# Patient Record
Sex: FEMALE | Race: WHITE | NOT HISPANIC OR LATINO | Employment: FULL TIME | URBAN - NONMETROPOLITAN AREA
[De-identification: names, ages, dates, MRNs, and addresses within clinical notes are randomized per-mention and may not be internally consistent; named-entity substitution may affect disease eponyms.]

---

## 2020-08-13 ENCOUNTER — APPOINTMENT (EMERGENCY)
Dept: CT IMAGING | Facility: HOSPITAL | Age: 60
DRG: 552 | End: 2020-08-13
Payer: COMMERCIAL

## 2020-08-13 ENCOUNTER — HOSPITAL ENCOUNTER (INPATIENT)
Facility: HOSPITAL | Age: 60
LOS: 1 days | Discharge: HOME | DRG: 552 | End: 2020-08-14
Attending: EMERGENCY MEDICINE | Admitting: INTERNAL MEDICINE
Payer: COMMERCIAL

## 2020-08-13 DIAGNOSIS — M54.50 ACUTE LOW BACK PAIN: Primary | ICD-10-CM

## 2020-08-13 DIAGNOSIS — K35.30 ACUTE APPENDICITIS WITH LOCALIZED PERITONITIS, WITHOUT PERFORATION OR GANGRENE, UNSPECIFIED WHETHER ABSCESS PRESENT: ICD-10-CM

## 2020-08-13 DIAGNOSIS — K37 APPENDICITIS: ICD-10-CM

## 2020-08-13 DIAGNOSIS — M54.9 BACK PAIN: ICD-10-CM

## 2020-08-13 LAB
ALBUMIN SERPL BCP-MCNC: 4.2 G/DL (ref 3.5–5)
ALP SERPL-CCNC: 72 U/L (ref 46–116)
ALT SERPL W P-5'-P-CCNC: 33 U/L (ref 12–78)
ANION GAP SERPL CALCULATED.3IONS-SCNC: 8 MMOL/L (ref 4–13)
AST SERPL W P-5'-P-CCNC: 22 U/L (ref 5–45)
BASOPHILS # BLD AUTO: 0.06 THOUSANDS/ΜL (ref 0–0.1)
BASOPHILS NFR BLD AUTO: 1 % (ref 0–1)
BILIRUB SERPL-MCNC: 0.34 MG/DL (ref 0.2–1)
BUN SERPL-MCNC: 21 MG/DL (ref 5–25)
CALCIUM SERPL-MCNC: 9.2 MG/DL (ref 8.3–10.1)
CHLORIDE SERPL-SCNC: 105 MMOL/L (ref 100–108)
CO2 SERPL-SCNC: 29 MMOL/L (ref 21–32)
CREAT SERPL-MCNC: 1.07 MG/DL (ref 0.6–1.3)
EOSINOPHIL # BLD AUTO: 0.19 THOUSAND/ΜL (ref 0–0.61)
EOSINOPHIL NFR BLD AUTO: 2 % (ref 0–6)
ERYTHROCYTE [DISTWIDTH] IN BLOOD BY AUTOMATED COUNT: 12.7 % (ref 11.6–15.1)
GFR SERPL CREATININE-BSD FRML MDRD: 57 ML/MIN/1.73SQ M
GLUCOSE SERPL-MCNC: 118 MG/DL (ref 65–140)
HCT VFR BLD AUTO: 41.7 % (ref 34.8–46.1)
HGB BLD-MCNC: 13.7 G/DL (ref 11.5–15.4)
IMM GRANULOCYTES # BLD AUTO: 0.04 THOUSAND/UL (ref 0–0.2)
IMM GRANULOCYTES NFR BLD AUTO: 0 % (ref 0–2)
LYMPHOCYTES # BLD AUTO: 2.49 THOUSANDS/ΜL (ref 0.6–4.47)
LYMPHOCYTES NFR BLD AUTO: 21 % (ref 14–44)
MCH RBC QN AUTO: 31.5 PG (ref 26.8–34.3)
MCHC RBC AUTO-ENTMCNC: 32.9 G/DL (ref 31.4–37.4)
MCV RBC AUTO: 96 FL (ref 82–98)
MONOCYTES # BLD AUTO: 0.8 THOUSAND/ΜL (ref 0.17–1.22)
MONOCYTES NFR BLD AUTO: 7 % (ref 4–12)
NEUTROPHILS # BLD AUTO: 8.45 THOUSANDS/ΜL (ref 1.85–7.62)
NEUTS SEG NFR BLD AUTO: 69 % (ref 43–75)
NRBC BLD AUTO-RTO: 0 /100 WBCS
PLATELET # BLD AUTO: 219 THOUSANDS/UL (ref 149–390)
PMV BLD AUTO: 11.2 FL (ref 8.9–12.7)
POTASSIUM SERPL-SCNC: 4.6 MMOL/L (ref 3.5–5.3)
PROT SERPL-MCNC: 7 G/DL (ref 6.4–8.2)
RBC # BLD AUTO: 4.35 MILLION/UL (ref 3.81–5.12)
SODIUM SERPL-SCNC: 142 MMOL/L (ref 136–145)
WBC # BLD AUTO: 12.03 THOUSAND/UL (ref 4.31–10.16)

## 2020-08-13 PROCEDURE — 96374 THER/PROPH/DIAG INJ IV PUSH: CPT

## 2020-08-13 PROCEDURE — 99285 EMERGENCY DEPT VISIT HI MDM: CPT

## 2020-08-13 PROCEDURE — 85025 COMPLETE CBC W/AUTO DIFF WBC: CPT | Performed by: EMERGENCY MEDICINE

## 2020-08-13 PROCEDURE — 36415 COLL VENOUS BLD VENIPUNCTURE: CPT | Performed by: EMERGENCY MEDICINE

## 2020-08-13 PROCEDURE — 96361 HYDRATE IV INFUSION ADD-ON: CPT

## 2020-08-13 PROCEDURE — 96375 TX/PRO/DX INJ NEW DRUG ADDON: CPT

## 2020-08-13 PROCEDURE — G1004 CDSM NDSC: HCPCS

## 2020-08-13 PROCEDURE — 80053 COMPREHEN METABOLIC PANEL: CPT | Performed by: EMERGENCY MEDICINE

## 2020-08-13 PROCEDURE — 99285 EMERGENCY DEPT VISIT HI MDM: CPT | Performed by: EMERGENCY MEDICINE

## 2020-08-13 PROCEDURE — 74177 CT ABD & PELVIS W/CONTRAST: CPT

## 2020-08-13 PROCEDURE — 96376 TX/PRO/DX INJ SAME DRUG ADON: CPT

## 2020-08-13 RX ORDER — NAPROXEN 500 MG/1
500 TABLET ORAL 2 TIMES DAILY WITH MEALS
Qty: 30 TABLET | Refills: 0 | Status: SHIPPED | OUTPATIENT
Start: 2020-08-13 | End: 2020-08-17 | Stop reason: ALTCHOICE

## 2020-08-13 RX ORDER — ROSUVASTATIN CALCIUM 10 MG/1
10 TABLET, COATED ORAL
COMMUNITY
Start: 2020-06-09

## 2020-08-13 RX ORDER — KETOROLAC TROMETHAMINE 30 MG/ML
30 INJECTION, SOLUTION INTRAMUSCULAR; INTRAVENOUS ONCE
Status: COMPLETED | OUTPATIENT
Start: 2020-08-13 | End: 2020-08-13

## 2020-08-13 RX ORDER — CYCLOBENZAPRINE HCL 10 MG
10 TABLET ORAL 2 TIMES DAILY PRN
Qty: 20 TABLET | Refills: 0 | Status: SHIPPED | OUTPATIENT
Start: 2020-08-13 | End: 2020-08-17 | Stop reason: ALTCHOICE

## 2020-08-13 RX ORDER — OLANZAPINE 10 MG/1
10 TABLET, ORALLY DISINTEGRATING ORAL ONCE
Status: COMPLETED | OUTPATIENT
Start: 2020-08-13 | End: 2020-08-13

## 2020-08-13 RX ORDER — DIAZEPAM 5 MG/ML
5 INJECTION, SOLUTION INTRAMUSCULAR; INTRAVENOUS ONCE
Status: COMPLETED | OUTPATIENT
Start: 2020-08-13 | End: 2020-08-13

## 2020-08-13 RX ORDER — FENTANYL CITRATE 50 UG/ML
50 INJECTION, SOLUTION INTRAMUSCULAR; INTRAVENOUS ONCE
Status: COMPLETED | OUTPATIENT
Start: 2020-08-13 | End: 2020-08-13

## 2020-08-13 RX ORDER — FENTANYL CITRATE 50 UG/ML
25 INJECTION, SOLUTION INTRAMUSCULAR; INTRAVENOUS ONCE
Status: COMPLETED | OUTPATIENT
Start: 2020-08-13 | End: 2020-08-13

## 2020-08-13 RX ADMIN — IOHEXOL 100 ML: 350 INJECTION, SOLUTION INTRAVENOUS at 22:24

## 2020-08-13 RX ADMIN — SODIUM CHLORIDE 1000 ML: 0.9 INJECTION, SOLUTION INTRAVENOUS at 21:49

## 2020-08-13 RX ADMIN — KETOROLAC TROMETHAMINE 30 MG: 30 INJECTION, SOLUTION INTRAMUSCULAR at 19:22

## 2020-08-13 RX ADMIN — FENTANYL CITRATE 25 MCG: 50 INJECTION INTRAMUSCULAR; INTRAVENOUS at 20:36

## 2020-08-13 RX ADMIN — DIAZEPAM 5 MG: 10 INJECTION, SOLUTION INTRAMUSCULAR; INTRAVENOUS at 19:23

## 2020-08-13 RX ADMIN — OLANZAPINE 10 MG: 10 TABLET, ORALLY DISINTEGRATING ORAL at 21:49

## 2020-08-13 RX ADMIN — FENTANYL CITRATE 50 MCG: 50 INJECTION INTRAMUSCULAR; INTRAVENOUS at 21:46

## 2020-08-13 NOTE — ED PROVIDER NOTES
History  Chief Complaint   Patient presents with    Back Pain     Patient evaluated yesterday, had XRs done  Patient reports sciatic pain from left hip down to knee  32-year-old female presents to ED with lower back pain x1 hour  Patient was recently seen evaluated with similar complaints yesterday  Back Pain   Location:  Lumbar spine  Quality:  Shooting and stabbing  Radiates to:  L thigh and L knee  Pain severity:  Moderate  Onset quality:  Sudden  Timing:  Intermittent  Progression:  Worsening  Chronicity:  Recurrent  Relieved by:  None tried  Worsened by:  Nothing  Ineffective treatments:  None tried  Associated symptoms: leg pain and tingling    Associated symptoms: no abdominal swelling, no bladder incontinence, no bowel incontinence, no perianal numbness and no weakness        Prior to Admission Medications   Prescriptions Last Dose Informant Patient Reported? Taking? rosuvastatin (CRESTOR) 10 MG tablet 8/13/2020 at Unknown time  Yes Yes   Sig: Take 10 mg by mouth daily      Facility-Administered Medications: None       Past Medical History:   Diagnosis Date    Anxiety     Hyperlipemia     Psychiatric disorder     Sciatic leg pain        Past Surgical History:   Procedure Laterality Date    CHOLECYSTECTOMY         History reviewed  No pertinent family history  I have reviewed and agree with the history as documented  E-Cigarette/Vaping    E-Cigarette Use Never User      E-Cigarette/Vaping Substances    Nicotine No     THC No     CBD No     Flavoring No     Other No     Unknown No      Social History     Tobacco Use    Smoking status: Current Some Day Smoker    Smokeless tobacco: Never Used   Substance Use Topics    Alcohol use: Never     Alcohol/week: 0 0 standard drinks     Frequency: Never     Drinks per session: Patient refused     Binge frequency: Never    Drug use: Never       Review of Systems   Gastrointestinal: Negative for bowel incontinence     Genitourinary: Negative for bladder incontinence  Musculoskeletal: Positive for back pain  Neurological: Positive for tingling  Negative for weakness  All other systems reviewed and are negative  Physical Exam  Physical Exam  Vitals signs and nursing note reviewed  Constitutional:       General: She is in acute distress  Appearance: Normal appearance  HENT:      Head: Normocephalic and atraumatic  Right Ear: External ear normal       Left Ear: External ear normal       Nose: Nose normal       Mouth/Throat:      Mouth: Mucous membranes are dry  Eyes:      Extraocular Movements: Extraocular movements intact  Pupils: Pupils are equal, round, and reactive to light  Neck:      Musculoskeletal: Normal range of motion and neck supple  Cardiovascular:      Rate and Rhythm: Normal rate and regular rhythm  Pulses: Normal pulses  Heart sounds: Normal heart sounds  Pulmonary:      Effort: Pulmonary effort is normal       Breath sounds: Normal breath sounds  Abdominal:      General: Abdomen is flat  Musculoskeletal: Normal range of motion  Skin:     General: Skin is warm and dry  Capillary Refill: Capillary refill takes less than 2 seconds  Neurological:      General: No focal deficit present  Mental Status: She is alert and oriented to person, place, and time     Psychiatric:         Mood and Affect: Mood normal          Vital Signs  ED Triage Vitals [08/13/20 1915]   Temperature Pulse Respirations Blood Pressure SpO2   97 6 °F (36 4 °C) 102 20 132/97 98 %      Temp Source Heart Rate Source Patient Position - Orthostatic VS BP Location FiO2 (%)   Temporal Monitor Lying Left arm --      Pain Score       Worst Possible Pain           Vitals:    08/13/20 1915 08/14/20 0111   BP: 132/97 126/70   Pulse: 102 61   Patient Position - Orthostatic VS: Lying          Visual Acuity      ED Medications  Medications   dextrose 5 % and sodium chloride 0 45 % infusion (125 mL/hr Intravenous New Bag 8/14/20 0217)   piperacillin-tazobactam (ZOSYN) 3 375 g in sodium chloride 0 9 % 100 mL IVPB (3 375 g Intravenous New Bag 8/14/20 0624)   ondansetron (ZOFRAN) injection 4 mg (has no administration in time range)   pantoprazole (PROTONIX) injection 40 mg (has no administration in time range)   acetaminophen (TYLENOL) tablet 650 mg (650 mg Oral Given 8/14/20 0451)   morphine (PF) 4 mg/mL injection 4 mg (has no administration in time range)   ketorolac (TORADOL) injection 30 mg (30 mg Intravenous Given 8/13/20 1922)   diazepam (VALIUM) injection 5 mg (5 mg Intravenous Given 8/13/20 1923)   fentanyl citrate (PF) 100 MCG/2ML 25 mcg (25 mcg Intravenous Given 8/13/20 2036)   sodium chloride 0 9 % bolus 1,000 mL (0 mL Intravenous Stopped 8/13/20 2344)   fentanyl citrate (PF) 100 MCG/2ML 50 mcg (50 mcg Intravenous Given 8/13/20 2146)   OLANZapine (ZyPREXA ZYDIS) dispersible tablet 10 mg (10 mg Oral Given 8/13/20 2149)   iohexol (OMNIPAQUE) 350 MG/ML injection (SINGLE-DOSE) 100 mL (100 mL Intravenous Given 8/13/20 2224)   piperacillin-tazobactam (ZOSYN) 3 375 g in sodium chloride 0 9 % 100 mL IVPB (3 375 g Intravenous New Bag 8/14/20 0058)       Diagnostic Studies  Results Reviewed     Procedure Component Value Units Date/Time    Lactic acid, plasma [[id number]]  (Normal) Collected:  08/14/20 0037    Lab Status:  Final result Specimen:  Blood from Arm, Right Updated:  08/14/20 0106     LACTIC ACID 0 8 mmol/L     Narrative:       Result may be elevated if tourniquet was used during collection  Blood culture [[id number]] Collected:  08/14/20 0037    Lab Status: In process Specimen:  Blood from Arm, Right Updated:  08/14/20 0046    Blood culture [[id number]] Collected:  08/14/20 0037    Lab Status:   In process Specimen:  Blood from Arm, Left Updated:  08/14/20 0046    Comprehensive metabolic panel [[id number]] Collected:  08/13/20 2145    Lab Status:  Final result Specimen:  Blood from Arm, Right Updated:  08/13/20 220 Sodium 142 mmol/L      Potassium 4 6 mmol/L      Chloride 105 mmol/L      CO2 29 mmol/L      ANION GAP 8 mmol/L      BUN 21 mg/dL      Creatinine 1 07 mg/dL      Glucose 118 mg/dL      Calcium 9 2 mg/dL      AST 22 U/L      ALT 33 U/L      Alkaline Phosphatase 72 U/L      Total Protein 7 0 g/dL      Albumin 4 2 g/dL      Total Bilirubin 0 34 mg/dL      eGFR 57 ml/min/1 73sq m     Narrative:       [place] guidelines for Chronic Kidney Disease (CKD):     Stage 1 with normal or high GFR (GFR > 90 mL/min/1 73 square meters)    Stage 2 Mild CKD (GFR = 60-89 mL/min/1 73 square meters)    Stage 3A Moderate CKD (GFR = 45-59 mL/min/1 73 square meters)    Stage 3B Moderate CKD (GFR = 30-44 mL/min/1 73 square meters)    Stage 4 Severe CKD (GFR = 15-29 mL/min/1 73 square meters)    Stage 5 End Stage CKD (GFR <15 mL/min/1 73 square meters)  Note: GFR calculation is accurate only with a steady state creatinine    CBC and differential [[id number]]  (Abnormal) Collected:  08/13/20 2145    Lab Status:  Final result Specimen:  Blood from Arm, Right Updated:  08/13/20 2154     WBC 12 03 Thousand/uL      RBC 4 35 Million/uL      Hemoglobin 13 7 g/dL      Hematocrit 41 7 %      MCV 96 fL      MCH 31 5 pg      MCHC 32 9 g/dL      RDW 12 7 %      MPV 11 2 fL      Platelets 023 Thousands/uL      nRBC 0 /100 WBCs      Neutrophils Relative 69 %      Immat GRANS % 0 %      Lymphocytes Relative 21 %      Monocytes Relative 7 %      Eosinophils Relative 2 %      Basophils Relative 1 %      Neutrophils Absolute 8 45 Thousands/µL      Immature Grans Absolute 0 04 Thousand/uL      Lymphocytes Absolute 2 49 Thousands/µL      Monocytes Absolute 0 80 Thousand/µL      Eosinophils Absolute 0 19 Thousand/µL      Basophils Absolute 0 06 Thousands/µL                  CT abdomen pelvis with contrast   Final Result by Mary Pierson MD (08/14 0014)         1    Distended distal appendix without surrounding inflammation, concerning for early, acute appendicitis, in the appropriate clinical context  No evidence of bowel obstruction, colitis or diverticulitis  2   Mild narrowing of the origin of the celiac axis, suggestive of median arcuate ligament syndrome, in the appropriate clinical context  I personally discussed this study with Ana Cotto on 8/13/2020 at 10:53 PM                      Workstation performed: [id number]         CT recon only lumbar spine   Final Result by David Parry MD (08/13 2306)      Chronic disc and facet degenerative change in the lower lumbar spine  Workstation performed: [id number]                    Procedures  Procedures         ED Course  ED Course as of Aug 14 0633   Thu Aug 13, 2020   2130 Patient continues have pain after analgesics provided  Will grab labs and sent patient down for CT scan  2140 Patient's family member requesting emergent MRI  Patient currently does not have saddle anesthesia, no history of trauma, cancer or bowel/urinary incontinence  Family member was informed that we will do imaging, however currently no indications for emergent MRI  2143 Patient's orthopedic visit from yesterday was reviewed in [facility]       2300 Case discussed with radiologist, concern for appendicitis  Will admit patient  Fri Aug 14, 2020   0028 Case discussed with surgery, requested medicine admit for appendicitis  [id number] Surgery (Dr Regina Austin) requesting Zosyn  9834 Case discussed with hospitalist who will admit the patient  US AUDIT      Most Recent Value   Initial Alcohol Screen: US AUDIT-C    1  How often do you have a drink containing alcohol?  0 Filed at: 08/13/2020 1916   2  How many drinks containing alcohol do you have on a typical day you are drinking? 0 Filed at: 08/13/2020 1916   3a  Male UNDER 65: How often do you have five or more drinks on one occasion? 0 Filed at: 08/13/2020 1916   3b  FEMALE Any Age, or MALE 65+:  How often do you have 4 or more drinks on one occassion? 0 Filed at: 08/13/2020 1916   Audit-C Score  0 Filed at: 08/13/2020 1916                  MONIQUE/DAST-10      Most Recent Value   How many times in the past year have you    Used an illegal drug or used a prescription medication for non-medical reasons? Never Filed at: 08/13/2020 1916                                MDM      Disposition  Final diagnoses:   Acute low back pain   Back pain   Appendicitis     Time reflects when diagnosis was documented in both MDM as applicable and the Disposition within this note     Time User Action Codes Description Comment    8/13/2020  7:17 PM Violet Devoid [M54 5] Acute low back pain     8/14/2020 12:18 AM Jorge Umu Add [M54 9] Back pain     8/14/2020 12:18 AM Jorge Umu Add [K37] Appendicitis     8/14/2020  1:20 AM Ilir Rosmery Add [K35 30] Acute appendicitis with localized peritonitis, without perforation or gangrene, unspecified whether abscess present       ED Disposition     ED Disposition Condition Date/Time Comment    Admit Stable Fri Aug 14, 2020 12:31 AM Case was discussed with Deana Bailey the patient's admission status was agreed to be Admission Status: inpatient status to the service of Dr Juan José Christian          Follow-up Information     Follow up With Specialties Details Why Contact Info    Rafiq Felder MD Family Medicine Call in 1 day  [address] [place] [phone]            Current Discharge Medication List      START taking these medications    Details   cyclobenzaprine (FLEXERIL) 10 mg tablet Take 1 tablet (10 mg total) by mouth 2 (two) times a day as needed for muscle spasms  Qty: 20 tablet, Refills: 0    Associated Diagnoses: Acute low back pain      naproxen (NAPROSYN) 500 mg tablet Take 1 tablet (500 mg total) by mouth 2 (two) times a day with meals  Qty: 30 tablet, Refills: 0    Associated Diagnoses: Acute low back pain         CONTINUE these medications which have NOT CHANGED Details   rosuvastatin (CRESTOR) 10 MG tablet Take 10 mg by mouth daily           No discharge procedures on file      PDMP Review     None          ED Provider  Electronically Signed by           Sam Lowery DO  08/14/20 3258

## 2020-08-13 NOTE — ED NOTES
Patient's family in waiting room requesting to come back  Informed of one visitor policy  Patient agreeable to giving family update  Updated family, family requesting MRI and follow up with pain management        Sandra Mccullough RN  08/13/20 9478

## 2020-08-14 ENCOUNTER — TRANSCRIBE ORDERS (OUTPATIENT)
Dept: NEUROSURGERY | Facility: CLINIC | Age: 60
End: 2020-08-14

## 2020-08-14 VITALS
TEMPERATURE: 98 F | WEIGHT: 162.8 LBS | BODY MASS INDEX: 28.84 KG/M2 | SYSTOLIC BLOOD PRESSURE: 130 MMHG | OXYGEN SATURATION: 96 % | DIASTOLIC BLOOD PRESSURE: 67 MMHG | RESPIRATION RATE: 17 BRPM | HEIGHT: 63 IN | HEART RATE: 64 BPM

## 2020-08-14 DIAGNOSIS — M54.50 LOWER BACK PAIN: Primary | ICD-10-CM

## 2020-08-14 PROBLEM — K37 APPENDICITIS: Status: ACTIVE | Noted: 2020-08-14

## 2020-08-14 PROBLEM — M54.32 SCIATICA OF LEFT SIDE: Status: ACTIVE | Noted: 2020-08-14

## 2020-08-14 PROBLEM — K37 APPENDICITIS: Status: RESOLVED | Noted: 2020-08-14 | Resolved: 2020-08-14

## 2020-08-14 PROBLEM — K35.30 ACUTE APPENDICITIS WITH LOCALIZED PERITONITIS, WITHOUT PERFORATION OR GANGRENE: Status: ACTIVE | Noted: 2020-08-14

## 2020-08-14 LAB
ANION GAP SERPL CALCULATED.3IONS-SCNC: 8 MMOL/L (ref 4–13)
BASOPHILS # BLD AUTO: 0.06 THOUSANDS/ΜL (ref 0–0.1)
BASOPHILS NFR BLD AUTO: 1 % (ref 0–1)
BILIRUB UR QL STRIP: NEGATIVE
BUN SERPL-MCNC: 15 MG/DL (ref 5–25)
CALCIUM SERPL-MCNC: 8.2 MG/DL (ref 8.3–10.1)
CHLORIDE SERPL-SCNC: 107 MMOL/L (ref 100–108)
CLARITY UR: CLEAR
CO2 SERPL-SCNC: 25 MMOL/L (ref 21–32)
COLOR UR: YELLOW
CREAT SERPL-MCNC: 1 MG/DL (ref 0.6–1.3)
EOSINOPHIL # BLD AUTO: 0.22 THOUSAND/ΜL (ref 0–0.61)
EOSINOPHIL NFR BLD AUTO: 3 % (ref 0–6)
ERYTHROCYTE [DISTWIDTH] IN BLOOD BY AUTOMATED COUNT: 12.8 % (ref 11.6–15.1)
GFR SERPL CREATININE-BSD FRML MDRD: 62 ML/MIN/1.73SQ M
GLUCOSE SERPL-MCNC: 119 MG/DL (ref 65–140)
GLUCOSE UR STRIP-MCNC: NEGATIVE MG/DL
HCT VFR BLD AUTO: 38.6 % (ref 34.8–46.1)
HGB BLD-MCNC: 12.7 G/DL (ref 11.5–15.4)
HGB UR QL STRIP.AUTO: NEGATIVE
IMM GRANULOCYTES # BLD AUTO: 0.02 THOUSAND/UL (ref 0–0.2)
IMM GRANULOCYTES NFR BLD AUTO: 0 % (ref 0–2)
KETONES UR STRIP-MCNC: NEGATIVE MG/DL
LACTATE SERPL-SCNC: 0.8 MMOL/L (ref 0.5–2)
LEUKOCYTE ESTERASE UR QL STRIP: NEGATIVE
LYMPHOCYTES # BLD AUTO: 2.73 THOUSANDS/ΜL (ref 0.6–4.47)
LYMPHOCYTES NFR BLD AUTO: 34 % (ref 14–44)
MAGNESIUM SERPL-MCNC: 1.8 MG/DL (ref 1.6–2.6)
MCH RBC QN AUTO: 31.4 PG (ref 26.8–34.3)
MCHC RBC AUTO-ENTMCNC: 32.9 G/DL (ref 31.4–37.4)
MCV RBC AUTO: 96 FL (ref 82–98)
MONOCYTES # BLD AUTO: 0.67 THOUSAND/ΜL (ref 0.17–1.22)
MONOCYTES NFR BLD AUTO: 8 % (ref 4–12)
NEUTROPHILS # BLD AUTO: 4.41 THOUSANDS/ΜL (ref 1.85–7.62)
NEUTS SEG NFR BLD AUTO: 54 % (ref 43–75)
NITRITE UR QL STRIP: NEGATIVE
NRBC BLD AUTO-RTO: 0 /100 WBCS
PH UR STRIP.AUTO: 5.5 [PH]
PLATELET # BLD AUTO: 194 THOUSANDS/UL (ref 149–390)
PMV BLD AUTO: 10.7 FL (ref 8.9–12.7)
POTASSIUM SERPL-SCNC: 3.9 MMOL/L (ref 3.5–5.3)
PROT UR STRIP-MCNC: NEGATIVE MG/DL
RBC # BLD AUTO: 4.04 MILLION/UL (ref 3.81–5.12)
SODIUM SERPL-SCNC: 140 MMOL/L (ref 136–145)
SP GR UR STRIP.AUTO: 1.01 (ref 1–1.03)
UROBILINOGEN UR QL STRIP.AUTO: 0.2 E.U./DL
WBC # BLD AUTO: 8.11 THOUSAND/UL (ref 4.31–10.16)

## 2020-08-14 PROCEDURE — 99222 1ST HOSP IP/OBS MODERATE 55: CPT | Performed by: NURSE PRACTITIONER

## 2020-08-14 PROCEDURE — 80048 BASIC METABOLIC PNL TOTAL CA: CPT | Performed by: NURSE PRACTITIONER

## 2020-08-14 PROCEDURE — 81003 URINALYSIS AUTO W/O SCOPE: CPT | Performed by: NURSE PRACTITIONER

## 2020-08-14 PROCEDURE — NC001 PR NO CHARGE: Performed by: INTERNAL MEDICINE

## 2020-08-14 PROCEDURE — 99254 IP/OBS CNSLTJ NEW/EST MOD 60: CPT | Performed by: PHYSICIAN ASSISTANT

## 2020-08-14 PROCEDURE — 36415 COLL VENOUS BLD VENIPUNCTURE: CPT | Performed by: EMERGENCY MEDICINE

## 2020-08-14 PROCEDURE — 87040 BLOOD CULTURE FOR BACTERIA: CPT | Performed by: EMERGENCY MEDICINE

## 2020-08-14 PROCEDURE — C9113 INJ PANTOPRAZOLE SODIUM, VIA: HCPCS | Performed by: NURSE PRACTITIONER

## 2020-08-14 PROCEDURE — 83605 ASSAY OF LACTIC ACID: CPT | Performed by: EMERGENCY MEDICINE

## 2020-08-14 PROCEDURE — 85025 COMPLETE CBC W/AUTO DIFF WBC: CPT | Performed by: NURSE PRACTITIONER

## 2020-08-14 PROCEDURE — 83735 ASSAY OF MAGNESIUM: CPT | Performed by: NURSE PRACTITIONER

## 2020-08-14 RX ORDER — KETOROLAC TROMETHAMINE 10 MG/1
10 TABLET, FILM COATED ORAL EVERY 6 HOURS PRN
Qty: 10 TABLET | Refills: 0 | Status: SHIPPED | OUTPATIENT
Start: 2020-08-14 | End: 2022-05-26

## 2020-08-14 RX ORDER — ACETAMINOPHEN 325 MG/1
650 TABLET ORAL EVERY 6 HOURS PRN
Qty: 30 TABLET | Refills: 0 | Status: SHIPPED | OUTPATIENT
Start: 2020-08-14 | End: 2022-05-26

## 2020-08-14 RX ORDER — ONDANSETRON 2 MG/ML
4 INJECTION INTRAMUSCULAR; INTRAVENOUS EVERY 6 HOURS PRN
Status: DISCONTINUED | OUTPATIENT
Start: 2020-08-14 | End: 2020-08-14 | Stop reason: HOSPADM

## 2020-08-14 RX ORDER — ACETAMINOPHEN 325 MG/1
650 TABLET ORAL EVERY 6 HOURS PRN
Status: DISCONTINUED | OUTPATIENT
Start: 2020-08-14 | End: 2020-08-14 | Stop reason: HOSPADM

## 2020-08-14 RX ORDER — DEXTROSE AND SODIUM CHLORIDE 5; .45 G/100ML; G/100ML
125 INJECTION, SOLUTION INTRAVENOUS CONTINUOUS
Status: DISCONTINUED | OUTPATIENT
Start: 2020-08-14 | End: 2020-08-14 | Stop reason: HOSPADM

## 2020-08-14 RX ORDER — MORPHINE SULFATE 4 MG/ML
4 INJECTION, SOLUTION INTRAMUSCULAR; INTRAVENOUS EVERY 4 HOURS PRN
Status: DISCONTINUED | OUTPATIENT
Start: 2020-08-14 | End: 2020-08-14 | Stop reason: HOSPADM

## 2020-08-14 RX ORDER — PANTOPRAZOLE SODIUM 40 MG/1
40 INJECTION, POWDER, FOR SOLUTION INTRAVENOUS
Status: DISCONTINUED | OUTPATIENT
Start: 2020-08-14 | End: 2020-08-14 | Stop reason: HOSPADM

## 2020-08-14 RX ADMIN — PANTOPRAZOLE SODIUM 40 MG: 40 INJECTION, POWDER, FOR SOLUTION INTRAVENOUS at 08:09

## 2020-08-14 RX ADMIN — DEXTROSE AND SODIUM CHLORIDE 125 ML/HR: 5; .45 INJECTION, SOLUTION INTRAVENOUS at 02:17

## 2020-08-14 RX ADMIN — Medication 3.38 G: at 06:24

## 2020-08-14 RX ADMIN — ACETAMINOPHEN 650 MG: 325 TABLET ORAL at 04:51

## 2020-08-14 RX ADMIN — MORPHINE SULFATE 2 MG: 2 INJECTION, SOLUTION INTRAMUSCULAR; INTRAVENOUS at 10:57

## 2020-08-14 RX ADMIN — MORPHINE SULFATE 4 MG: 4 INJECTION INTRAVENOUS at 08:05

## 2020-08-14 RX ADMIN — Medication 3.38 G: at 00:58

## 2020-08-14 NOTE — PLAN OF CARE
Problem: PAIN - ADULT  Goal: Verbalizes/displays adequate comfort level or baseline comfort level  Description: Interventions:  - Encourage patient to monitor pain and request assistance  - Assess pain using appropriate pain scale  - Administer analgesics based on type and severity of pain and evaluate response  - Implement non-pharmacological measures as appropriate and evaluate response  - Consider cultural and social influences on pain and pain management  - Notify physician/advanced practitioner if interventions unsuccessful or patient reports new pain  Outcome: Progressing     Problem: INFECTION - ADULT  Goal: Absence or prevention of progression during hospitalization  Description: INTERVENTIONS:  - Assess and monitor for signs and symptoms of infection  - Monitor lab/diagnostic results  - Monitor all insertion sites, i e  indwelling lines, tubes, and drains  - Monitor endotracheal if appropriate and nasal secretions for changes in amount and color  - [place] appropriate cooling/warming therapies per order  - Administer medications as ordered  - Instruct and encourage patient and family to use good hand hygiene technique  - Identify and instruct in appropriate isolation precautions for identified infection/condition  Outcome: Progressing  Goal: Absence of fever/infection during neutropenic period  Description: INTERVENTIONS:  - Monitor WBC    Outcome: Progressing     Problem: SAFETY ADULT  Goal: Patient will remain free of falls  Description: INTERVENTIONS:  - Assess patient frequently for physical needs  -  Identify cognitive and physical deficits and behaviors that affect risk of falls    -  [place] fall precautions as indicated by assessment   - Educate patient/family on patient safety including physical limitations  - Instruct patient to call for assistance with activity based on assessment  - Modify environment to reduce risk of injury  - Consider OT/PT consult to assist with strengthening/mobility  Outcome: Progressing  Goal: Maintain or return to baseline ADL function  Description: INTERVENTIONS:  -  Assess patient's ability to carry out ADLs; assess patient's baseline for ADL function and identify physical deficits which impact ability to perform ADLs (bathing, care of mouth/teeth, toileting, grooming, dressing, etc )  - Assess/evaluate cause of self-care deficits   - Assess range of motion  - Assess patient's mobility; develop plan if impaired  - Assess patient's need for assistive devices and provide as appropriate  - Encourage maximum independence but intervene and supervise when necessary  - Involve family in performance of ADLs  - Assess for home care needs following discharge   - Consider OT consult to assist with ADL evaluation and planning for discharge  - Provide patient education as appropriate  Outcome: Progressing  Goal: Maintain or return mobility status to optimal level  Description: INTERVENTIONS:  - Assess patient's baseline mobility status (ambulation, transfers, stairs, etc )    - Identify cognitive and physical deficits and behaviors that affect mobility  - Identify mobility aids required to assist with transfers and/or ambulation (gait belt, sit-to-stand, lift, walker, cane, etc )  - [place] fall precautions as indicated by assessment  - Record patient progress and toleration of activity level on Mobility SBAR; progress patient to next Phase/Stage  - Instruct patient to call for assistance with activity based on assessment  - Consider rehabilitation consult to assist with strengthening/weightbearing, etc   Outcome: Progressing     Problem: DISCHARGE PLANNING  Goal: Discharge to home or other facility with appropriate resources  Description: INTERVENTIONS:  - Identify barriers to discharge w/patient and caregiver  - Arrange for needed discharge resources and transportation as appropriate  - Identify discharge learning needs (meds, wound care, etc )  - Arrange for interpretive services to assist at discharge as needed  - Refer to Case Management Department for coordinating discharge planning if the patient needs post-hospital services based on physician/advanced practitioner order or complex needs related to functional status, cognitive ability, or social support system  Outcome: Progressing     Problem: Knowledge Deficit  Goal: Patient/family/caregiver demonstrates understanding of disease process, treatment plan, medications, and discharge instructions  Description: Complete learning assessment and assess knowledge base    Interventions:  - Provide teaching at level of understanding  - Provide teaching via preferred learning methods  Outcome: Progressing

## 2020-08-14 NOTE — PLAN OF CARE
Problem: PAIN - ADULT  Goal: Verbalizes/displays adequate comfort level or baseline comfort level  Description: Interventions:  - Encourage patient to monitor pain and request assistance  - Assess pain using appropriate pain scale  - Administer analgesics based on type and severity of pain and evaluate response  - Implement non-pharmacological measures as appropriate and evaluate response  - Consider cultural and social influences on pain and pain management  - Notify physician/advanced practitioner if interventions unsuccessful or patient reports new pain  Outcome: Progressing     Problem: INFECTION - ADULT  Goal: Absence or prevention of progression during hospitalization  Description: INTERVENTIONS:  - Assess and monitor for signs and symptoms of infection  - Monitor lab/diagnostic results  - Monitor all insertion sites, i e  indwelling lines, tubes, and drains  - Monitor endotracheal if appropriate and nasal secretions for changes in amount and color  - [place] appropriate cooling/warming therapies per order  - Administer medications as ordered  - Instruct and encourage patient and family to use good hand hygiene technique  - Identify and instruct in appropriate isolation precautions for identified infection/condition  Outcome: Progressing  Goal: Absence of fever/infection during neutropenic period  Description: INTERVENTIONS:  - Monitor WBC    Outcome: Progressing

## 2020-08-14 NOTE — DISCHARGE SUMMARY
Discharge- Norva Lennox 1960, 64 y o  female MRN: [id number]    Unit/Bed#: -01 Encounter: [id number]    Primary Care Provider: Alejandra Barr MD   Date and time admitted to hospital: 8/13/2020  7:14 PM    Sciatica of left side  Assessment & Plan  · History of left low back pain radiating down the medial thigh to knee  · Evaluated by orthopedics 8/12 with referral to pain management  · No neurologic deficit  · Outpatient PT and pain management script given  · Medically stable for discharge    CT recon lumbar spine- ALIGNMENT: Normal alignment of the lumbar spine  VERTEBRAL BODIES: No fracture, lytic or blastic lesion  DEGENERATIVE CHANGES:  Mild disc degenerative change at L1-2, L2-3 and L3-4    L4-5 posterior disc bulge and disc osteophytes  Mild central canal stenosis and neural foraminal narrowing    L5-S1 posterior disc osteophytes and left facet osteophytosis  Mild central canal stenosis and moderate left foraminal narrowing  PREVERTEBRAL AND PARASPINAL SOFT TISSUES: No mass or fluid collection    Discharging Physician / Practitioner: Livan Mohamud MD  PCP: Alejandra Barr MD  Admission Date:   Admission Orders (From admission, onward)     Ordered        08/14/20 0032  Inpatient Admission  Once                   Discharge Date: 08/14/20    Disposition:      Other: Home    For Discharges to [facility] SNF:   · Not Applicable to this Patient - Not Applicable to this Patient    Reason for Admission:  Low back pain    Discharge Diagnoses:     Please see assessment and plan section above for further details regarding discharge diagnoses       Resolved Problems  Never Reviewed          Resolved    * (Principal) Appendicitis 8/14/2020     Resolved by  Livan Mohamud MD          Consultations During Hospital Stay:  IP CONSULT TO ACUTE CARE SURGERY     Procedures Performed:   * No surgery found *      Ct Recon Only Lumbar Spine    Result Date: 8/13/2020  Narrative: CT LUMBAR SPINE INDICATION: Back pain  COMPARISON:  8/13/2020 TECHNIQUE: Axial CT examination of the lumbar spine was obtained utilizing reconstructed images from CT of the chest, abdomen and pelvis performed the same day  Images were reformatted in the sagittal and coronal planes  This examination, like all CT scans performed in the [facility], was performed utilizing techniques to minimize radiation dose exposure, including the use of iterative reconstruction and automated exposure control  FINDINGS: ALIGNMENT: Normal alignment of the lumbar spine VERTEBRAL BODIES: No fracture, lytic or blastic lesion  DEGENERATIVE CHANGES: Mild disc degenerative change at L1-2, L2-3 and L3-4  L4-5 posterior disc bulge and disc osteophytes  Mild central canal stenosis and neural foraminal narrowing  L5-S1 posterior disc osteophytes and left facet osteophytosis  Mild central canal stenosis and moderate left foraminal narrowing  PREVERTEBRAL AND PARASPINAL SOFT TISSUES: No mass or fluid collection  Impression: Chronic disc and facet degenerative change in the lower lumbar spine  Workstation performed: [id number]     Ct Abdomen Pelvis With Contrast    Result Date: 8/14/2020  Narrative: CT ABDOMEN AND PELVIS WITH IV CONTRAST INDICATION:   Flank and back pain  COMPARISON:  10/27/2005 and 8/12/2020  TECHNIQUE:  CT examination of the abdomen and pelvis was performed  Axial, sagittal, and coronal 2D reformatted images were created from the source data and submitted for interpretation  Radiation dose length product (DLP) for this visit:  710 mGy-cm   This examination, like all CT scans performed in the [facility], was performed utilizing techniques to minimize radiation dose exposure, including the use of iterative reconstruction and automated exposure control  IV Contrast:  100 mL of iohexol (OMNIPAQUE) Enteric Contrast:  Enteric contrast was not administered  FINDINGS: ABDOMEN LOWER CHEST:  Clear lung bases   LIVER/BILIARY TREE:  Mildly distended intrahepatic bile ducts and the common bile duct, likely secondary to postsurgical change  No evidence of choledocholithiasis  GALLBLADDER:  Cholecystectomy  SPLEEN:  Unremarkable  PANCREAS:  Unremarkable  ADRENAL GLANDS:  Unremarkable  KIDNEYS/URETERS:  No pyelonephritis or obstructive uropathy  STOMACH AND BOWEL:  No bowel obstruction, colitis or diverticulitis  APPENDIX:  Distended appendix measuring 12 mm in diameter at the tip and filled with fluid  Mild wall thickening  No periappendiceal inflammation  ABDOMINOPELVIC CAVITY:  No ascites  No pneumoperitoneum  No lymphadenopathy  VESSELS:  Mild to moderate (50-60% narrowing of the origin of the celiac axis origin  No distal vessel stenosis or occlusion  PELVIS REPRODUCTIVE ORGANS:  No adnexal mass or cyst  URINARY BLADDER:  Unremarkable  ABDOMINAL WALL/INGUINAL REGIONS:  Unremarkable  OSSEOUS STRUCTURES:  No lytic or blastic lesion  Mild degenerative change in the lower lumbar spine  Impression: 1  Distended distal appendix without surrounding inflammation, concerning for early, acute appendicitis, in the appropriate clinical context  No evidence of bowel obstruction, colitis or diverticulitis  2   Mild narrowing of the origin of the celiac axis, suggestive of median arcuate ligament syndrome, in the appropriate clinical context  I personally discussed this study with Jhony Fuentes on 8/13/2020 at 10:53 PM  Workstation performed: [id number]     Xr Outside Images    Result Date: 8/13/2020  Narrative: 1 2 840 [id number] 2 37 2 [id number]  3 [id number]    Xr Outside Images    Result Date: 8/13/2020  Narrative: 1 2 840 [id number] 2 37 2 [id number]  1 [id number]       Medication Adjustments and Discharge Medications:  · Summary of Medication Adjustments made as a result of this hospitalization:  Toradol and Tylenol  · Medication Dosing Tapers - Please refer to Discharge Medication List for details on any medication dosing tapers (if applicable to patient)  · Discharge Medication List: See after visit summary for reconciled discharge medications  Wound Care Recommendations:  When applicable, please see wound care section of After Visit Summary  Diet Recommendations at Discharge:  Diet -        Diet Orders   (From admission, onward)             Start     Ordered    08/14/20 0844  Diet Clear Liquid  Diet effective now     Question:  Diet Type  Answer:  Clear Liquid    08/14/20 0843                  Incidental Findings:   · None     Test Results Pending at Discharge (will require follow up): · None         Hospital Course:     Aure Mayes is a 64 y o  female patient who originally presented to the hospital on 8/13/2020 due to acute low back pain with left sciatica, CT lumbosacral with reconstruction done (see above)  Neurologic deficit  Advised to follow with pain management and primary care physician as an outpatient  Outpatient script given on discharge  There was a report of possible appendicitis on the CT scan report however clinically patient has no evidence for acute appendicitis  Appreciate surgery evaluation  Medically stable for discharge      Condition at Discharge: stable     Discharge Day Visit / Exam:     Subjective:  No complaints  Vitals: Blood Pressure: 130/67 (08/14/20 0746)  Pulse: 64 (08/14/20 0746)  Temperature: 98 °F (36 7 °C) (08/14/20 0746)  Temp Source: Temporal (08/13/20 1915)  Respirations: 17 (08/14/20 0746)  Height: 5' 3" (160 cm) (08/14/20 0100)  Weight - Scale: 73 8 kg (162 lb 12 8 oz) (08/14/20 0100)  SpO2: 96 % (08/14/20 0746)  Exam:     General appearance: alert, appears stated age and cooperative  Head: Normocephalic, without obvious abnormality, atraumatic  Lungs: clear to auscultation bilaterally  Heart: regular rate and rhythm  Abdomen: soft, non-tender, positive bowel sounds   Back: negative  Extremities: extremities atraumatic, no cyanosis or edema  Neurologic: Grossly normal      Discharge instructions/Information to patient and family:   See after visit summary section titled Discharge Instructions for information provided to patient and family  Planned Readmission:  No      Discharge Statement:  I spent 35 minutes discharging the patient  This time was spent on the day of discharge  I had direct contact with the patient on the day of discharge  Greater than 50% of the total time was spent examining patient, answering all patient questions, arranging and discussing plan of care with patient as well as directly providing post-discharge instructions  Additional time then spent on discharge activities      ** Please Note: This note has been constructed using a voice recognition system **

## 2020-08-14 NOTE — UTILIZATION REVIEW
Initial Clinical Review    Admission: Date/Time/Statement:   Admission Orders (From admission, onward)     Ordered        08/14/20 0032  Inpatient Admission  Once                   Orders Placed This Encounter   Procedures    Inpatient Admission     Standing Status:   Standing     Number of Occurrences:   1     Order Specific Question:   Admitting Physician     Answer:   Martha Lang [[id number]]     Order Specific Question:   Level of Care     Answer:   Med Surg [16]     Order Specific Question:   Estimated length of stay     Answer:   Not Applicable     ED Arrival Information     Expected Arrival Acuity Means of Arrival Escorted By Service Admission Type    - 8/13/2020 19:08 Less Urgent Walk-In Family Member Hospitalist Urgent    Arrival Complaint    back pain        Chief Complaint   Patient presents with    Back Pain     Patient evaluated yesterday, had XRs done  Patient reports sciatic pain from left hip down to knee  Assessment/Plan: 64year old female, presented to the ED @ [place] [address], from home via walk in with family member  Admitted as Inpatient due to appendicitis  Presents with history of sciatica left lower extremity evaluated by orthopedics 08/12/2020 with referral to pain management, presented to the emergency department with 10/10 severe low back pain radiating to left thigh and knee, unrelieved by home remedies  Patient received multiple doses of narcotic pain medication in the the emergency department  CT pelvis performed for severe back pain with findings concerning for appendicitis  NPO  IV hydration  Consult Gen Surgery  Zosyn PRN  Pain Control PRN  VTE Prophylaxis: Pharmacologic VTE Prophylaxis contraindicated due to Possible operative procedure today  / sequential compression device    08/14/2020  Consult General Surgery:  No acute surgical intervention    Very low suspicion for appendicitis in setting of normal labs, no pain  Clear liquid diet this morning, if tolerates may advance to regular diet  ED Triage Vitals [08/13/20 1915]   Temperature Pulse Respirations Blood Pressure SpO2   97 6 °F (36 4 °C) 102 20 132/97 98 %      Temp Source Heart Rate Source Patient Position - Orthostatic VS BP Location FiO2 (%)   Temporal Monitor Lying Left arm --      Pain Score       Worst Possible Pain          Wt Readings from Last 1 Encounters:   08/14/20 73 8 kg (162 lb 12 8 oz)     Additional Vital Signs:   Date/Time   Temp   Pulse   Resp   BP   MAP (mmHg)   SpO2   O2 Device   Patient Position - Orthostatic VS    08/14/20 07:46:29   98 °F (36 7 °C)   64   17   130/67   88   96 %   --   --    08/14/20 01:11:44   98 1 °F (36 7 °C)   61   20   126/70   89   97 %   --   --      08/13/2020 @ 2244  CT abd/pel:  1   Distended distal appendix without surrounding inflammation, concerning for early, acute appendicitis, in the appropriate clinical context   No evidence of bowel obstruction, colitis or diverticulitis  2   Mild narrowing of the origin of the celiac axis, suggestive of median arcuate ligament syndrome, in the appropriate clinical context      08/13/2020 @ 2254  CT lumbar Spine:  Chronic disc and facet degenerative change in the lower lumbar spine       Pertinent Labs/Diagnostic Test Results:     Results from last 7 days   Lab Units 08/14/20  0607 08/13/20  2145   WBC Thousand/uL 8 11 12 03*   HEMOGLOBIN g/dL 12 7 13 7   HEMATOCRIT % 38 6 41 7   PLATELETS Thousands/uL 194 219   NEUTROS ABS Thousands/µL 4 41 8 45*     Results from last 7 days   Lab Units 08/14/20  0607 08/13/20  2145   SODIUM mmol/L 140 142   POTASSIUM mmol/L 3 9 4 6   CHLORIDE mmol/L 107 105   CO2 mmol/L 25 29   ANION GAP mmol/L 8 8   BUN mg/dL 15 21   CREATININE mg/dL 1 00 1 07   EGFR ml/min/1 73sq m 62 57   CALCIUM mg/dL 8 2* 9 2   MAGNESIUM mg/dL 1 8  --      Results from last 7 days   Lab Units 08/13/20  2145   AST U/L 22   ALT U/L 33   ALK PHOS U/L 72   TOTAL PROTEIN g/dL 7 0   ALBUMIN g/dL 4 2   TOTAL BILIRUBIN mg/dL 0 34     Results from last 7 days   Lab Units 08/14/20  0607 08/13/20  2145   GLUCOSE RANDOM mg/dL 119 118     Results from last 7 days   Lab Units 08/14/20  0037   LACTIC ACID mmol/L 0 8     Results from last 7 days   Lab Units 08/14/20  0813   CLARITY UA  Clear   COLOR UA  Yellow   SPEC GRAV UA  1 015   PH UA  5 5   GLUCOSE UA mg/dl Negative   KETONES UA mg/dl Negative   BLOOD UA  Negative   PROTEIN UA mg/dl Negative   NITRITE UA  Negative   BILIRUBIN UA  Negative   UROBILINOGEN UA E U /dl 0 2   LEUKOCYTES UA  Negative     ED Treatment:   Medication Administration from 08/13/2020 1908 to 08/14/2020 0104       Date/Time Order Dose Route Action     08/13/2020 1922 ketorolac (TORADOL) injection 30 mg 30 mg Intravenous Given     08/13/2020 1923 diazepam (VALIUM) injection 5 mg 5 mg Intravenous Given     08/13/2020 2036 fentanyl citrate (PF) 100 MCG/2ML 25 mcg 25 mcg Intravenous Given     08/13/2020 2149 sodium chloride 0 9 % bolus 1,000 mL 1,000 mL Intravenous New Bag     08/13/2020 2146 fentanyl citrate (PF) 100 MCG/2ML 50 mcg 50 mcg Intravenous Given     08/13/2020 2149 OLANZapine (ZyPREXA ZYDIS) dispersible tablet 10 mg 10 mg Oral Given     08/13/2020 2224 iohexol (OMNIPAQUE) 350 MG/ML injection (SINGLE-DOSE) 100 mL 100 mL Intravenous Given     08/14/2020 0058 piperacillin-tazobactam (ZOSYN) 3 375 g in sodium chloride 0 9 % 100 mL IVPB 3 375 g Intravenous New Bag        Past Medical History:   Diagnosis Date    Anxiety     Hyperlipemia     Psychiatric disorder     Sciatic leg pain      Present on Admission:   (Resolved) Appendicitis   Sciatica of left side    Admitting Diagnosis: Back pain [M54 9]  Appendicitis [K37]  Acute low back pain [M54 5]  Age/Sex: 64 y o  female  Admission Orders:  Scheduled Medications:  pantoprazole, 40 mg, Intravenous, Q24H [facility]  piperacillin-tazobactam, 3 375 g, Intravenous, Q6H    Continuous IV Infusions:  dextrose 5 % and sodium chloride 0 45 %, 125 mL/hr, Intravenous, Continuous    PRN Meds:  acetaminophen, 650 mg, Oral, Q6H PRN  X 1 dose 8/14  morphine injection, 4 mg, Intravenous, Q4H PRN  X 1 dose 8/14  ondansetron, 4 mg, Intravenous, Q6H PRN    Clear liq diet  [facility]  IP CONSULT TO ACUTE CARE SURGERY    Network Utilization Review Department  [email or web address]  ATTENTION: Please call with any questions or concerns to [phone] and carefully listen to the prompts so that you are directed to the right person  All voicemails are confidential   Candia Siemens all requests for admission clinical reviews, approved or denied determinations and any other requests to dedicated fax number below belonging to the campus where the patient is receiving treatment   List of dedicated fax numbers for the Facilities:  [address] DENIALS (Administrative/Medical Necessity) [phone]   [address] (Maternity/NICU/Pediatrics) [phone]   Dayday Engle [phone]   Angelia Horta [phone]   [facility] [phone]   Loretta Christensen [phone]   [address] [phone]   [facility]  [phone]   [address] [facility]  [address] [facility] [address] [facility] [phone]

## 2020-08-14 NOTE — ASSESSMENT & PLAN NOTE
· History of left low back pain radiating down the medial thigh to knee  · Evaluated by orthopedics 8/12 with referral to pain management      CT recon lumbar spine- ALIGNMENT: Normal alignment of the lumbar spine  VERTEBRAL BODIES: No fracture, lytic or blastic lesion  DEGENERATIVE CHANGES:  Mild disc degenerative change at L1-2, L2-3 and L3-4    L4-5 posterior disc bulge and disc osteophytes  Mild central canal stenosis and neural foraminal narrowing    L5-S1 posterior disc osteophytes and left facet osteophytosis  Mild central canal stenosis and moderate left foraminal narrowing   PREVERTEBRAL AND PARASPINAL SOFT TISSUES: No mass or fluid collection

## 2020-08-14 NOTE — ASSESSMENT & PLAN NOTE
· History of left low back pain radiating down the medial thigh to knee  · Evaluated by orthopedics 8/12 with referral to pain management  · No neurologic deficit  · Outpatient PT and pain management script given  · Medically stable for discharge      CT recon lumbar spine- ALIGNMENT: Normal alignment of the lumbar spine  VERTEBRAL BODIES: No fracture, lytic or blastic lesion  DEGENERATIVE CHANGES:  Mild disc degenerative change at L1-2, L2-3 and L3-4    L4-5 posterior disc bulge and disc osteophytes  Mild central canal stenosis and neural foraminal narrowing    L5-S1 posterior disc osteophytes and left facet osteophytosis  Mild central canal stenosis and moderate left foraminal narrowing   PREVERTEBRAL AND PARASPINAL SOFT TISSUES: No mass or fluid collection

## 2020-08-14 NOTE — ED NOTES
Patient's family called again stating they want an MRI and the patient to be admitted  Explained there is no MRI available at this time  Stating we are not adequately relieving patient's pain  Explained medications again and patient's family requesting to speak with doctor  Requesting call at [phone]  Dr Becca Owens made aware        Pj Niño RN  08/13/20 6172

## 2020-08-14 NOTE — ASSESSMENT & PLAN NOTE
· Patient presents with severe left low back pain radiating to left inner thigh radiating to left knee  · CT abdomen and pelvis revealed early appendicitis  · ED discussed with general surgery with recommendations for hospitalization and consultation  · NPO  · IV hydration  · General surgery consult  · Zosyn  · Pain control  · Patient denies any current or history of abdominal pain, nausea/vomiting/diarrhea, fevers/chills

## 2020-08-14 NOTE — NURSING NOTE
Peripheral IV removed  Belongings sent home with pt  Premedicated for pain management prior to discharge  Pt being driven by spouse at discharge  AVS printed and reviewed with pt and dgt at bedside  Ambulatory scipts printed and handed to pt  Medication scripts sent electronically to pharmacy  All questions answered  Pt taken via wheelchair to car by PCA

## 2020-08-14 NOTE — H&P
H&P- Nic Mckeonx 1960, 64 y o  female MRN: [id number]    Unit/Bed#: -Jeff Encounter: [id number]    Primary Care Provider: Alejandra Barr MD   Date and time admitted to hospital: 8/13/2020  7:14 PM    * Appendicitis  Assessment & Plan  · Patient presents with severe left low back pain radiating to left inner thigh radiating to left knee  · CT abdomen and pelvis revealed early appendicitis  · ED discussed with general surgery with recommendations for hospitalization and consultation  · NPO  · IV hydration  · General surgery consult  · Zosyn  · Pain control  · Patient denies any current or history of abdominal pain, nausea/vomiting/diarrhea, fevers/chills    Sciatica of left side  Assessment & Plan  · History of left low back pain radiating down the medial thigh to knee  · Evaluated by orthopedics 8/12 with referral to pain management      CT recon lumbar spine- ALIGNMENT: Normal alignment of the lumbar spine  VERTEBRAL BODIES: No fracture, lytic or blastic lesion  DEGENERATIVE CHANGES:  Mild disc degenerative change at L1-2, L2-3 and L3-4    L4-5 posterior disc bulge and disc osteophytes  Mild central canal stenosis and neural foraminal narrowing    L5-S1 posterior disc osteophytes and left facet osteophytosis  Mild central canal stenosis and moderate left foraminal narrowing  PREVERTEBRAL AND PARASPINAL SOFT TISSUES: No mass or fluid collection      VTE Prophylaxis: Pharmacologic VTE Prophylaxis contraindicated due to Possible operative procedure today  / sequential compression device   Code Status:  Full  POLST: POLST form is not discussed and not completed at this time  Anticipated Length of Stay:  Patient will be admitted on an Inpatient basis with an anticipated length of stay of  > 2 midnights  Justification for Hospital Stay:  Appendicitis    Total Time for Visit, including Counseling / Coordination of Care: 30 minutes    Greater than 50% of this total time spent on direct patient counseling and coordination of care  Chief Complaint:   Back pain  History of Present Illness:    Balta John is a 64 y o  female who presents with history sciatica left lower extremity evaluated by orthopedics 08/12/2020 with referral to pain management presented to the emergency department with 10/10 severe low back pain radiating to left thigh and knee unrelieved by home remedies  Patient received multiple doses of narcotic pain medication in the the emergency department  CT pelvis performed for severe back pain with findings concerning for appendicitis  Emergency department physician discussed with general surgery with recommendations for admission and consultation  Pain controlled at time of admission  History of generalized anxiety disorder, received Zyprexa in emergency department with relief of symptoms  Review of Systems:    Review of Systems   Constitutional: Negative for activity change, appetite change, chills, diaphoresis, fatigue, fever and unexpected weight change  HENT: Negative for congestion, sore throat and trouble swallowing  Eyes: Negative for visual disturbance  Respiratory: Negative for cough and chest tightness  Cardiovascular: Negative for chest pain, palpitations and leg swelling  Gastrointestinal: Negative for abdominal distention, abdominal pain, constipation, diarrhea, nausea and vomiting  Endocrine: Negative for polydipsia, polyphagia and polyuria  Genitourinary: Negative for decreased urine volume, difficulty urinating and dysuria  Musculoskeletal: Positive for back pain and gait problem  Negative for arthralgias and myalgias  Skin: Negative for rash  Allergic/Immunologic: Negative for immunocompromised state  Neurological: Negative for dizziness, seizures, syncope, weakness, numbness and headaches  Hematological: Does not bruise/bleed easily  Psychiatric/Behavioral: Negative for sleep disturbance  The patient is not nervous/anxious      All other systems reviewed and are negative  Past Medical and Surgical History:     Past Medical History:   Diagnosis Date    Hyperlipemia     Sciatic leg pain        Past Surgical History:   Procedure Laterality Date    CHOLECYSTECTOMY         Meds/Allergies:    Prior to Admission medications    Medication Sig Start Date End Date Taking? 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  Patient Pre-hospital Living Situation:  Independent  Patient Pre-hospital Level of Mobility:  Independent  Patient Pre-hospital Diet Restrictions:  None  Substance Use History:   Social History     Substance and Sexual Activity   Alcohol Use Never    Alcohol/week: 0 0 standard drinks    Frequency: Never    Drinks per session: Patient refused    Binge frequency: Never     Social History     Tobacco Use   Smoking Status Current Some Day Smoker   Smokeless Tobacco Never Used     Social History     Substance and Sexual Activity   Drug Use Never       Family History:    non-contributory    Physical Exam:     Vitals:   Blood Pressure: 126/70 (08/14/20 0111)  Pulse: 61 (08/14/20 0111)  Temperature: 98 1 °F (36 7 °C) (08/14/20 0111)  Temp Source: Temporal (08/13/20 1915)  Respirations: 20 (08/14/20 0111)  Height: 5' 3" (160 cm) (08/14/20 0100)  Weight - Scale: 73 8 kg (162 lb 12 8 oz) (08/14/20 0100)  SpO2: 97 % (08/14/20 0111)    Physical Exam  Vitals signs and nursing note reviewed  Constitutional:       Appearance: Normal appearance  She is normal weight  HENT:      Head: Normocephalic and atraumatic  Eyes:      Conjunctiva/sclera: Conjunctivae normal    Neck:      Musculoskeletal: Normal range of motion and neck supple  Cardiovascular:      Rate and Rhythm: Normal rate and regular rhythm  Pulses: Normal pulses  Heart sounds: Normal heart sounds  Pulmonary:      Effort: Pulmonary effort is normal       Breath sounds: Normal breath sounds  Abdominal:      General: Abdomen is flat  Palpations: Abdomen is soft  Tenderness: There is no abdominal tenderness  Musculoskeletal:         General: Tenderness present  Lumbar back: She exhibits tenderness  Skin:     General: Skin is warm and dry  Capillary Refill: Capillary refill takes less than 2 seconds  Neurological:      General: No focal deficit present  Mental Status: She is alert and oriented to person, place, and time  Cranial Nerves: No cranial nerve deficit  Sensory: No sensory deficit  Motor: No weakness  Coordination: Coordination normal       Gait: Gait normal    Psychiatric:         Mood and Affect: Mood normal          Behavior: Behavior normal          Additional Data:     Lab Results: I have personally reviewed pertinent reports        Results from last 7 days   Lab Units 08/13/20  2145   WBC Thousand/uL 12 03*   HEMOGLOBIN g/dL 13 7   HEMATOCRIT % 41 7   PLATELETS Thousands/uL 219   NEUTROS PCT % 69   LYMPHS PCT % 21   MONOS PCT % 7   EOS PCT % 2     Results from last 7 days   Lab Units 08/13/20  2145   SODIUM mmol/L 142   POTASSIUM mmol/L 4 6   CHLORIDE mmol/L 105   CO2 mmol/L 29   BUN mg/dL 21   CREATININE mg/dL 1 07   ANION GAP mmol/L 8   CALCIUM mg/dL 9 2   ALBUMIN g/dL 4 2   TOTAL BILIRUBIN mg/dL 0 34   ALK PHOS U/L 72   ALT U/L 33   AST U/L 22   GLUCOSE RANDOM mg/dL 118                 Results from last 7 days   Lab Units 08/14/20  0037   LACTIC ACID mmol/L 0 8       Imaging: I have personally reviewed pertinent films in PACS    CT abdomen pelvis with contrast   Final Result by Rufino Sparrow MD (08/14 0014)         1  Distended distal appendix without surrounding inflammation, concerning for early, acute appendicitis, in the appropriate clinical context  No evidence of bowel obstruction, colitis or diverticulitis  2   Mild narrowing of the origin of the celiac axis, suggestive of median arcuate ligament syndrome, in the appropriate clinical context  I personally discussed this study with Marva Vazquez on 8/13/2020 at 10:53 PM                      Workstation performed: [id number]         CT recon only lumbar spine   Final Result by Rufino Sparrow MD (08/13 8744)      Chronic disc and facet degenerative change in the lower lumbar spine  Workstation performed: [id number]             EKG, Pathology, and Other Studies Reviewed on Admission:   All  Allscripts / Epic Records Reviewed: Yes     ** Please Note: This note has been constructed using a voice recognition system   **

## 2020-08-14 NOTE — CONSULTS
Consultation - General Surgery   Kimberly Sarah 64 y o  female MRN: [id number]  Unit/Bed#: -01 Encounter: [id number]    Assessment/Plan     Assessment:  Sciatic type pain of left hip/thigh  Leukocytosis resolved 8 11 (12 03)  CT finding of "Distended distal appendix without surrounding inflammation, concerning for early, acute appendicitis, in the appropriate clinical context  No evidence of bowel obstruction, colitis or diverticulitis  Plan:  No acute surgical intervention  Very low suspicion for appendicitis in setting of normal labs, no pain  Clear liquid diet this morning, if tolerates may advance to regular diet  If tolerates diet today may discharge home tonight  Recommend following up with pain management as previously planned        History of Present Illness     HPI:  Kimberly Sarah is a 64 y o  female who presents with complaints of left hip and thigh pain/numbness  She states that she is waiting for an appointment with a pain management specialist and will be undergoing MRI in the near future  During work up in the ED yesterday a CT abdomen/pelvis was performed which showed "Distended distal appendix without surrounding inflammation, concerning for early, acute appendicitis, in the appropriate clinical context   No evidence of bowel obstruction, colitis or diverticulitis  She denies fevers/chills  No nausea/vomiting  Is passing gas and moving bowels  No abdominal trauma  No recent travel or sick contacts Abdominal surgery significant for cholecystectomy  At time of exam this morning the patient states that she does not have any abdominal pain  Inpatient consult to Acute Care Surgery  Consult performed by: Michael Mejia PA-C  Consult ordered by: SREEDHAR Malhotra        Review of Systems   Constitutional: Negative  HENT: Negative  Eyes: Negative  Respiratory: Negative  Cardiovascular: Negative  Gastrointestinal: Negative  Endocrine: Negative  Genitourinary: Negative  Musculoskeletal: Positive for back pain  Left hip and thigh pain/numbness   Skin: Negative  Allergic/Immunologic: Negative  Neurological:        Numbness of left hip/thigh area   Hematological: Negative  Psychiatric/Behavioral: Negative  Historical Information   Past Medical History:   Diagnosis Date    Anxiety     Hyperlipemia     Psychiatric disorder     Sciatic leg pain      Past Surgical History:   Procedure Laterality Date    CHOLECYSTECTOMY       Social History   Social History     Substance and Sexual Activity   Alcohol Use Never    Alcohol/week: 0 0 standard drinks    Frequency: Never    Drinks per session: Patient refused    Binge frequency: Never     Social History     Substance and Sexual Activity   Drug Use Never     E-Cigarette/Vaping    E-Cigarette Use Never User      E-Cigarette/Vaping Substances    Nicotine No     THC No     CBD No     Flavoring No     Other No     Unknown No      Social History     Tobacco Use   Smoking Status Current Some Day Smoker   Smokeless Tobacco Never Used     Family History: non-contributory    Meds/Allergies   all current active meds have been reviewed, current meds:   Current Facility-Administered Medications   Medication Dose Route Frequency    acetaminophen (TYLENOL) tablet 650 mg  650 mg Oral Q6H PRN    dextrose 5 % and sodium chloride 0 45 % infusion  125 mL/hr Intravenous Continuous    morphine (PF) 4 mg/mL injection 4 mg  4 mg Intravenous Q4H PRN    ondansetron (ZOFRAN) injection 4 mg  4 mg Intravenous Q6H PRN    pantoprazole (PROTONIX) injection 40 mg  40 mg Intravenous Q24H MUSTAPHA    piperacillin-tazobactam (ZOSYN) 3 375 g in sodium chloride 0 9 % 100 mL IVPB  3 375 g Intravenous Q6H    and PTA meds:   Prior to Admission Medications   Prescriptions Last Dose Informant Patient Reported? Taking?    rosuvastatin (CRESTOR) 10 MG tablet 8/13/2020 at Unknown time  Yes Yes   Sig: Take 10 mg by mouth daily      Facility-Administered Medications: None     No Known Allergies    Objective   First Vitals:   Blood Pressure: 132/97 (08/13/20 1915)  Pulse: 102 (08/13/20 1915)  Temperature: 97 6 °F (36 4 °C) (08/13/20 1915)  Temp Source: Temporal (08/13/20 1915)  Respirations: 20 (08/13/20 1915)  Height: 5' 3" (160 cm) (08/13/20 1915)  Weight - Scale: 72 1 kg (159 lb) (08/13/20 1915)  SpO2: 98 % (08/13/20 1915)    Current Vitals:   Blood Pressure: 130/67 (08/14/20 0746)  Pulse: 64 (08/14/20 0746)  Temperature: 98 °F (36 7 °C) (08/14/20 0746)  Temp Source: Temporal (08/13/20 1915)  Respirations: 17 (08/14/20 0746)  Height: 5' 3" (160 cm) (08/14/20 0100)  Weight - Scale: 73 8 kg (162 lb 12 8 oz) (08/14/20 0100)  SpO2: 96 % (08/14/20 0746)      Intake/Output Summary (Last 24 hours) at 8/14/2020 0814  Last data filed at 8/14/2020 0801  Gross per 24 hour   Intake 1716 67 ml   Output --   Net 1716 67 ml       Invasive Devices     Peripheral Intravenous Line            Peripheral IV 08/13/20 Right Antecubital less than 1 day                Physical Exam  Vitals signs and nursing note reviewed  Constitutional:       General: She is not in acute distress  Appearance: Normal appearance  She is not ill-appearing, toxic-appearing or diaphoretic  HENT:      Head: Normocephalic and atraumatic  Right Ear: External ear normal       Left Ear: External ear normal       Nose: Nose normal       Mouth/Throat:      Mouth: Mucous membranes are dry  Pharynx: Oropharynx is clear  Eyes:      Extraocular Movements: Extraocular movements intact  Conjunctiva/sclera: Conjunctivae normal       Pupils: Pupils are equal, round, and reactive to light  Neck:      Musculoskeletal: Normal range of motion and neck supple  No muscular tenderness  Cardiovascular:      Rate and Rhythm: Normal rate and regular rhythm  Pulses: Normal pulses  Heart sounds: Normal heart sounds  No murmur     Pulmonary:      Effort: Pulmonary effort is normal  No respiratory distress  Breath sounds: Normal breath sounds  Abdominal:      General: Abdomen is flat  Bowel sounds are normal  There is no distension  Palpations: Abdomen is soft  There is no mass  Tenderness: There is no abdominal tenderness  There is no guarding or rebound  Hernia: No hernia is present  Musculoskeletal: Normal range of motion  General: No swelling  Comments: There is tenderness over left greater trochanter   Skin:     General: Skin is warm and dry  Capillary Refill: Capillary refill takes less than 2 seconds  Coloration: Skin is not jaundiced  Neurological:      General: No focal deficit present  Mental Status: She is alert and oriented to person, place, and time  Cranial Nerves: No cranial nerve deficit  Psychiatric:         Mood and Affect: Mood normal          Behavior: Behavior normal          Thought Content: Thought content normal          Judgment: Judgment normal          Lab Results:   I have personally reviewed pertinent lab results  CBC:   Lab Results   Component Value Date    WBC 8 11 08/14/2020    HGB 12 7 08/14/2020    HCT 38 6 08/14/2020    MCV 96 08/14/2020     08/14/2020    MCH 31 4 08/14/2020    MCHC 32 9 08/14/2020    RDW 12 8 08/14/2020    MPV 10 7 08/14/2020    NRBC 0 08/14/2020     CMP:   Lab Results   Component Value Date    SODIUM 140 08/14/2020    K 3 9 08/14/2020     08/14/2020    CO2 25 08/14/2020    BUN 15 08/14/2020    CREATININE 1 00 08/14/2020    CALCIUM 8 2 (L) 08/14/2020    AST 22 08/13/2020    ALT 33 08/13/2020    ALKPHOS 72 08/13/2020    EGFR 62 08/14/2020     Imaging: I have personally reviewed pertinent reports       CT abd/pelvis w contrast 8/13/20  Impression:      1   Distended distal appendix without surrounding inflammation, concerning for early, acute appendicitis, in the appropriate clinical context   No evidence of bowel obstruction, colitis or diverticulitis  2   Mild narrowing of the origin of the celiac axis, suggestive of median arcuate ligament syndrome, in the appropriate clinical context  EKG, Pathology, and Other Studies: I have personally reviewed pertinent reports  Counseling / Coordination of Care  Total floor / unit time spent today 40 minutes  Greater than 50% of total time was spent with the patient and / or family counseling and / or coordination of care  A description of the counseling / coordination of care: review of labs and imaging, obtaining history and performing exam, discussion of findings and treatment options      Mj Varela PA-C

## 2020-08-17 ENCOUNTER — OFFICE VISIT (OUTPATIENT)
Dept: NEUROSURGERY | Facility: CLINIC | Age: 60
End: 2020-08-17
Payer: COMMERCIAL

## 2020-08-17 VITALS
DIASTOLIC BLOOD PRESSURE: 86 MMHG | SYSTOLIC BLOOD PRESSURE: 134 MMHG | HEART RATE: 85 BPM | RESPIRATION RATE: 16 BRPM | TEMPERATURE: 98.5 F | HEIGHT: 63 IN | BODY MASS INDEX: 27.82 KG/M2 | WEIGHT: 157 LBS

## 2020-08-17 DIAGNOSIS — M54.32 SCIATICA OF LEFT SIDE: Primary | ICD-10-CM

## 2020-08-17 DIAGNOSIS — M54.50 LOWER BACK PAIN: ICD-10-CM

## 2020-08-17 PROCEDURE — 99205 OFFICE O/P NEW HI 60 MIN: CPT | Performed by: RADIOLOGY

## 2020-08-17 RX ORDER — GABAPENTIN 100 MG/1
100 CAPSULE ORAL 3 TIMES DAILY
Qty: 90 CAPSULE | Refills: 0 | Status: SHIPPED | OUTPATIENT
Start: 2020-08-17 | End: 2020-09-10 | Stop reason: SDUPTHER

## 2020-08-17 RX ORDER — METHYLPREDNISOLONE 4 MG/1
TABLET ORAL
Qty: 1 EACH | Refills: 0 | Status: SHIPPED | OUTPATIENT
Start: 2020-08-17 | End: 2020-09-04

## 2020-08-17 RX ORDER — METHOCARBAMOL 500 MG/1
500 TABLET, FILM COATED ORAL 4 TIMES DAILY
Qty: 120 TABLET | Refills: 0 | Status: SHIPPED | OUTPATIENT
Start: 2020-08-17 | End: 2020-09-10 | Stop reason: SDUPTHER

## 2020-08-17 NOTE — PROGRESS NOTES
Assessment/Plan:     Diagnoses and all orders for this visit:    Sciatica of left side  -     MRI lumbar spine without contrast; Future  -     Ambulatory referral to Physical Therapy; Future  -     gabapentin (NEURONTIN) 100 mg capsule; Take 1 capsule (100 mg total) by mouth 3 (three) times a day  -     methocarbamol (ROBAXIN) 500 mg tablet; Take 1 tablet (500 mg total) by mouth 4 (four) times a day  -     methylPREDNISolone 4 MG tablet therapy pack; Use as directed on package    Lower back pain  -     Ambulatory referral to Neurosurgery        Discussion Summary:   Cristhian Le has symptoms consistent with a left sided L4 radiculopathy associated with weakness, pain and numbness  However there may be an element of hip dysfunction as much of her pain is elicited with the LEIF maneuver  SI joint dysfunction not excluded either  Her work up thus far is incomplete and she may require surgery for this therefore an MRI is indicated  I have also started her on gabapentin which she will slowly ramp up her dosages  I recommended that she take Tylenol around the clock  She may also taking NSAID around the clock  I have replaced flexeril with robaxin as she reported no efficacy  Finally she will try a short course of steroids  Referral for physical therapy was provided  She will return to clinic once an MRI is complete  We will assess her response to medications and physical therapy a few weeks and determine if further intervention is needed such as an epidural steroid injection or surgery  Chief Complaint: Consult      Patient ID: Chelsie Lee is a 64 y o  female    HPI     Ms Dempsey Fabry presents for evaluation of her lower back and left leg pain  She describes having daily left knee pain for year  Bothersome, but not severely debilitating  Then, suddenly about 2 weeks ago, developed severe lower back pain radiating into her left buttock and down the lateral thigh wrapping anteriorly into her knee   There is associated numbness and tingling  Pain is worst at night when trying to lay into bed as well as extending the back  Laying down in bed hurts the most  Pain is otherwise constantly 7-8/10  Medications are not helping  Denies bowel or bladder dysfunction  States she think she is weak in her left leg due to pain  Walking and balance has been of particular difficulty  Review of Systems   Constitutional: Negative  HENT: Negative  Eyes: Negative  Respiratory: Negative  Cardiovascular: Negative  Gastrointestinal: Negative  Endocrine: Negative  Genitourinary: Negative  Musculoskeletal: Positive for back pain (lower back pain down into her left leg), gait problem (due to back pain) and myalgias (lower back into left leg)  Negative for neck pain and neck stiffness  Skin: Negative  Allergic/Immunologic: Negative  Neurological: Positive for weakness (left leg) and numbness (left thigh into her knee)  Negative for dizziness, tremors, seizures, syncope, speech difficulty, light-headedness and headaches  Hematological: Negative  Psychiatric/Behavioral: Negative  I have personally reviewed the MA's review of systems and made changes as necessary      The following portions of the patient's history were reviewed and updated as appropriate: allergies, current medications, past family history, past medical history, past social history, past surgical history and problem list       Active Ambulatory Problems     Diagnosis Date Noted    Sciatica of left side 08/14/2020     Resolved Ambulatory Problems     Diagnosis Date Noted    Appendicitis 08/14/2020     Past Medical History:   Diagnosis Date    Anxiety     Hyperlipemia     Psychiatric disorder     Sciatic leg pain        Past Surgical History:   Procedure Laterality Date    CHOLECYSTECTOMY       Current Outpatient Medications on File Prior to Visit   Medication Sig Dispense Refill    acetaminophen (TYLENOL) 325 mg tablet Take 2 tablets (650 mg total) by mouth every 6 (six) hours as needed for mild pain, headaches or fever 30 tablet 0    ketorolac (TORADOL) 10 mg tablet Take 1 tablet (10 mg total) by mouth every 6 (six) hours as needed for moderate pain 10 tablet 0    rosuvastatin (CRESTOR) 10 MG tablet Take 10 mg by mouth daily       No current facility-administered medications on file prior to visit  Vitals:    08/17/20 1222   BP: 134/86   Pulse: 85   Resp: 16   Temp: 98 5 °F (36 9 °C)         Objective:    Physical Exam  Constitutional:       Appearance: Normal appearance  HENT:      Head: Normocephalic and atraumatic  Eyes:      Extraocular Movements: Extraocular movements intact  Pupils: Pupils are equal, round, and reactive to light  Cardiovascular:      Rate and Rhythm: Normal rate and regular rhythm  Pulses: Normal pulses  Pulmonary:      Effort: Pulmonary effort is normal    Abdominal:      General: Abdomen is flat  Musculoskeletal: Normal range of motion  General: Tenderness present  No swelling, deformity or signs of injury  Comments: Tenderness to palpation over the left lumbrosacral junction and over the buttock  +LEIF  -SLR  Pain reproduced with extension   Skin:     General: Skin is warm and dry  Neurological:      Mental Status: She is alert and oriented to person, place, and time  Cranial Nerves: No cranial nerve deficit  Sensory: Sensory deficit present  Motor: Weakness present  Coordination: Coordination normal       Gait: Gait abnormal       Comments: 4/5 left HF  Otherwise 5/5 throughout  Decreased sensation to light tough over the anterolateral thigh  Antalgic gait  Neurologic Exam     Mental Status   Oriented to person, place, and time  Cranial Nerves     CN III, IV, VI   Pupils are equal, round, and reactive to light  Results/Data:  I have reviewed the results and images from the CT in detail with the patient

## 2020-08-18 ENCOUNTER — HOSPITAL ENCOUNTER (OUTPATIENT)
Dept: MRI IMAGING | Facility: HOSPITAL | Age: 60
Discharge: HOME | End: 2020-08-18
Attending: RADIOLOGY
Payer: COMMERCIAL

## 2020-08-18 DIAGNOSIS — M54.32 SCIATICA OF LEFT SIDE: ICD-10-CM

## 2020-08-18 PROCEDURE — A9585 GADOBUTROL INJECTION: HCPCS | Performed by: RADIOLOGY

## 2020-08-18 PROCEDURE — G1004 CDSM NDSC: HCPCS

## 2020-08-18 PROCEDURE — 72158 MRI LUMBAR SPINE W/O & W/DYE: CPT

## 2020-08-18 RX ADMIN — GADOBUTROL 7 ML: 604.72 INJECTION INTRAVENOUS at 15:11

## 2020-08-19 ENCOUNTER — EVALUATION (OUTPATIENT)
Dept: PHYSICAL THERAPY | Facility: CLINIC | Age: 60
End: 2020-08-19
Payer: COMMERCIAL

## 2020-08-19 DIAGNOSIS — M54.32 SCIATICA OF LEFT SIDE: Primary | ICD-10-CM

## 2020-08-19 LAB
BACTERIA BLD CULT: NORMAL
BACTERIA BLD CULT: NORMAL

## 2020-08-19 PROCEDURE — 97162 PT EVAL MOD COMPLEX 30 MIN: CPT | Performed by: PHYSICAL THERAPIST

## 2020-08-19 NOTE — PROGRESS NOTES
PT Evaluation     Today's date: 2020  Patient name: Aure Mayes  : 1960  MRN: [id number]  Referring provider: No ref  provider found  Dx:   Encounter Diagnosis     ICD-10-CM    1  Sciatica of left side  M54 32                   Assessment  Assessment details: Patient is a 64year old female who presents to PT with c/o pain in left side LB into her left LE to her knee  PT examination shows limitations including weakness, decreased stability, paresthesias and radicular pain, (+) SLR testing due to disc herniation in lumbar spine  Patient would benefit from PT intervention including exercises for core and lumbar stability, direction specific exercises, manual therapy, traction, HEP, functional training, aerobic conditioning and pain relieving modalities in order to maximize functional independence and return to PLOF  Impairments: abnormal gait, abnormal muscle tone, activity intolerance, impaired physical strength, lacks appropriate home exercise program and pain with function    Goals  ST  Initiate HEP  2  Patient to report decreased pain at worst by 25% in 3 weeks    LT  Patient to report decreased pain at worst by % in 6 weeks  2  Patient to improve left LE strength to [facility] in 6 weeks  3  Patient to improve function and work ability to [facility] in 6 weeks  4   Decrease radicular pain by 50% in 4-6 weeks    Plan  Patient would benefit from: PT eval and skilled physical therapy  Planned modality interventions: cryotherapy, thermotherapy: hydrocollator packs, ultrasound and unattended electrical stimulation  Other planned modality interventions: Modalities PRN  Planned therapy interventions: abdominal trunk stabilization, joint mobilization, manual therapy, massage, patient education, strengthening, stretching, therapeutic activities, therapeutic exercise, therapeutic training, functional ROM exercises, flexibility, graded activity, graded exercise and home exercise program  Frequency: 2x week  Duration in visits: 8  Duration in weeks: 4  Treatment plan discussed with: patient        Subjective Evaluation    History of Present Illness  Date of onset: 2020  Mechanism of injury: Patient presents to PT with c/o radicular pain in left LE to her knee  Patient reports the pain began a few months ago without known injury  Patient reports the pain began as "thumping" in her left knee that eventually became mild left low back pain with pain along lateral hip and anterior thigh into her knee  Patient also has numbness in the same pattern  Patient reports the pain worsens with sitting and walking long distances  Patient had an MRI which showed 2 bulging discs  Patient RTD in 2 weeks and is now referred to oppt  Pain  At best pain ratin  At worst pain ratin  Quality: burning (Tingling)  Relieving factors: medications, ice and heat  Aggravating factors: sitting and walking  Progression: no change      Diagnostic Tests  MRI studies: abnormal (Disc Herniations most notably at L3)  Patient Goals  Patient goals for therapy: decreased pain          Objective     Concurrent Complaints  Negative for bladder dysfunction, bowel dysfunction and saddle (S4) numbness    Palpation   Left   Hypertonic in the lumbar paraspinals  Muscle spasm in the lumbar paraspinals       Additional Palpation Details  Significant left lumbar paraspinal tightness    Tenderness     Additional Tenderness Details  Patient with TTP at left lumbar sacral junction area    Neurological Testing     Sensation     Lumbar   Left   Diminished: light touch  Paresthesia: light touch    Right   Intact: light touch    Reflexes   Left   Patellar (L4): normal (2+)  Achilles (S1): normal (2+)    Right   Patellar (L4): normal (2+)  Achilles (S1): normal (2+)    Active Range of Motion     Lumbar   Normal active range of motion  Mechanical Assessment    Cervical      Thoracic      Lumbar    Standing flexion:   Pain location:no change  Standing extension:   Pain intensity: worse    Strength/Myotome Testing     Lumbar     Right   Normal strength    Left Hip   Planes of Motion   Flexion: 4  Abduction: 5  Adduction: 5    Left Knee   Flexion: 4  Extension: 4    Left Ankle/Foot   Dorsiflexion: 4+    Tests     Lumbar     Left   Positive passive SLR  Left Hip   Negative LEIF, FADIR and long sit  Right Hip   Negative long sit  Ambulation     Observational Gait   Gait: antalgic   Decreased left stance time and left step length  Quality of Movement During Gait     Knee    Knee (Left): Positive stiff knee       General Comments:    Lower quarter screen   Hips: unremarkable  Knees: unremarkable  Foot/ankle: unremarkable             Precautions: Disc Herniation                Manuals 8/19/20       LE Stretch                                Neuro Re-Ed         Abd Brace        Brace with March        Bridges        Clamshell                                Ther Ex        Standing trunk Ext        Prone lying with left S/L        YOUNG        PPU                                        Ther Activity                        Gait Training                        Modalities

## 2020-08-20 ENCOUNTER — OFFICE VISIT (OUTPATIENT)
Dept: PHYSICAL THERAPY | Facility: CLINIC | Age: 60
End: 2020-08-20
Payer: COMMERCIAL

## 2020-08-20 DIAGNOSIS — M54.32 SCIATICA OF LEFT SIDE: Primary | ICD-10-CM

## 2020-08-20 PROCEDURE — 97014 ELECTRIC STIMULATION THERAPY: CPT | Performed by: PHYSICAL THERAPIST

## 2020-08-20 PROCEDURE — 97140 MANUAL THERAPY 1/> REGIONS: CPT | Performed by: PHYSICAL THERAPIST

## 2020-08-20 NOTE — PROGRESS NOTES
Daily Note     Today's date: 2020  Patient name: Blanche Record  : 1960  MRN: [id number]  Referring provider: Tiffanie Mustafa MD  Dx:   Encounter Diagnosis     ICD-10-CM    1  Sciatica of left side  M54 32                   Subjective: Patient states "My knee is sore"  Objective: See treatment diary below  Manuals 20      LE Stretch   Piriformis, calf, distraction 10 min                              Neuro Re-Ed         Abd Brace  2x10      Brace with March        Bridges  10x      Clamshell                                Ther Ex        Standing trunk Ext        Prone lying with left S/L        YOUNG  3x30"      PPU                                        Ther Activity                        Gait Training                        Modalities        IFC with MHP in supine  15 min                  Assessment: Patient with good tolerance to first treatment today  Minimal VC's required for correct performance of TE  Patient able to tolerate some YOUNG today with good results  Will continue to monitor symptoms and progress extension biased program as tolerated  Plan: Continue per plan of care        Precautions: Disc Herniation

## 2020-08-26 ENCOUNTER — OFFICE VISIT (OUTPATIENT)
Dept: PHYSICAL THERAPY | Facility: CLINIC | Age: 60
End: 2020-08-26
Payer: COMMERCIAL

## 2020-08-26 DIAGNOSIS — M54.32 SCIATICA OF LEFT SIDE: Primary | ICD-10-CM

## 2020-08-26 PROCEDURE — 97112 NEUROMUSCULAR REEDUCATION: CPT | Performed by: PHYSICAL THERAPIST

## 2020-08-26 PROCEDURE — 97140 MANUAL THERAPY 1/> REGIONS: CPT | Performed by: PHYSICAL THERAPIST

## 2020-08-26 PROCEDURE — 97014 ELECTRIC STIMULATION THERAPY: CPT | Performed by: PHYSICAL THERAPIST

## 2020-08-26 NOTE — PROGRESS NOTES
Daily Note     Today's date: 2020  Patient name: Ayanna Medeiros  : 1960  MRN: [id number]  Referring provider: Chris Austin MD  Dx:   Encounter Diagnosis     ICD-10-CM    1  Sciatica of left side  M54 32                   Subjective: Patient states "My knee is still hurting  The doctor said if therapy doesn't work I'll get an epidural  I don't get any pain in my back"  Objective: See treatment diary below  Manuals 20     LE Stretch   Piriformis, calf, distraction 10 min 10 min                             Neuro Re-Ed         Abd Brace  2x10 2x10     Brace with March        Bridges  10x 2x10     Clamshell        Hip Adduction squeeze   2x10                     Ther Ex        Standing trunk Ext        Prone lying with left S/L        YOUNG  3x30" 3x30"     PPU                                        Ther Activity                        Gait Training                        Modalities        IFC with MHP in supine  15 min 15 min                 Assessment: Tolerated treatment well  Patient exhibited good technique with therapeutic exercises  Patient able to complete more rom with YOUNG today  Will attempt PPU at [facility] 144  Plan: Continue per plan of care        Precautions: Disc Herniation

## 2020-08-27 ENCOUNTER — OFFICE VISIT (OUTPATIENT)
Dept: PHYSICAL THERAPY | Facility: CLINIC | Age: 60
End: 2020-08-27
Payer: COMMERCIAL

## 2020-08-27 DIAGNOSIS — M54.32 SCIATICA OF LEFT SIDE: Primary | ICD-10-CM

## 2020-08-27 PROCEDURE — 97014 ELECTRIC STIMULATION THERAPY: CPT | Performed by: PHYSICAL THERAPIST

## 2020-08-27 PROCEDURE — 97140 MANUAL THERAPY 1/> REGIONS: CPT | Performed by: PHYSICAL THERAPIST

## 2020-08-27 PROCEDURE — 97112 NEUROMUSCULAR REEDUCATION: CPT | Performed by: PHYSICAL THERAPIST

## 2020-08-27 NOTE — PROGRESS NOTES
Daily Note     Today's date: 2020  Patient name: Alvin Guadarrama  : 1960  MRN: [id number]  Referring provider: Leonor Poole MD  Dx:   Encounter Diagnosis     ICD-10-CM    1  Sciatica of left side  M54 32                   Subjective: "I don't get the bad pain as much anymore  I think the medications took that away"  Objective: See treatment diary below  Manuals 20    LE Stretch   Piriformis, calf, distraction 10 min 10 min 10 min                            Neuro Re-Ed         Abd Brace  2x10 2x10 2x10    Brace with March    2x10 B/L    Bridges  10x 2x10 2x10    Clamshell        Hip Adduction squeeze   2x10 2x10                    Ther Ex        Standing trunk Ext        Prone lying with left S/L        YOUNG  3x30" 3x30" 3x30"    PPU    10x with OP                                    Ther Activity                        Gait Training                        Modalities        IFC with MHP in supine  15 min 15 min                 Assessment: Tolerated treatment well  Patient exhibited good technique with therapeutic exercises  Patient able to tolerate PPU today  Improved extension noted  Plan: Continue per plan of care        Precautions: Disc Herniation

## 2020-09-02 ENCOUNTER — OFFICE VISIT (OUTPATIENT)
Dept: PHYSICAL THERAPY | Facility: CLINIC | Age: 60
End: 2020-09-02
Payer: COMMERCIAL

## 2020-09-02 DIAGNOSIS — M54.32 SCIATICA OF LEFT SIDE: Primary | ICD-10-CM

## 2020-09-02 PROCEDURE — 97014 ELECTRIC STIMULATION THERAPY: CPT

## 2020-09-02 PROCEDURE — 97140 MANUAL THERAPY 1/> REGIONS: CPT

## 2020-09-02 PROCEDURE — 97112 NEUROMUSCULAR REEDUCATION: CPT

## 2020-09-02 NOTE — PROGRESS NOTES
Daily Note     Today's date: 2020  Patient name: Fidencio Dalal  : 1960  MRN: [id number]  Referring provider: Keisha Delgadillo MD  Dx:   Encounter Diagnosis     ICD-10-CM    1  Sciatica of left side  M54 32        Start Time: 1350  Stop Time: 1450  Total time in clinic (min): 60 minutes    Subjective: Patient stated that she has been feeling good, but today has been a "bad day " She is waiting for MD to schedule her to have an injection for back  She stated also noticeable weakness in L LE > R       Objective: PTA directed patient in TE program  IFC/Hp applied to LB in supine post treat, See treatment diary below  Assessment: Tolerated treatment well  Patient exhibited good technique with therapeutic exercises, no pain or discomfort noted with TE  Plan: Continue per plan of care        Precautions: Disc Herniation    Manuals 20   LE Stretch   Piriformis, calf, distraction 10 min 10 min 10 min 10min                            Neuro Re-Ed         Abd Brace  2x10 2x10 2x10 2x10   Brace with March    2x10 B/L 2x10 B/L   Bridges  10x 2x10 2x10 2x10   Clamshell        Hip Adduction squeeze   2x10 2x10 2x10                   Ther Ex        Standing trunk Ext        Prone lying with left S/L        YOUNG  3x30" 3x30" 3x30" 3x30"   PPU    10x with OP 10x                                    Ther Activity                        Gait Training                        Modalities        IFC with MHP in supine  15 min 15 min  15 min

## 2020-09-03 ENCOUNTER — OFFICE VISIT (OUTPATIENT)
Dept: PHYSICAL THERAPY | Facility: CLINIC | Age: 60
End: 2020-09-03
Payer: COMMERCIAL

## 2020-09-03 DIAGNOSIS — M54.32 SCIATICA OF LEFT SIDE: Primary | ICD-10-CM

## 2020-09-03 PROCEDURE — 97140 MANUAL THERAPY 1/> REGIONS: CPT | Performed by: PHYSICAL THERAPIST

## 2020-09-03 PROCEDURE — 97110 THERAPEUTIC EXERCISES: CPT | Performed by: PHYSICAL THERAPIST

## 2020-09-03 PROCEDURE — 97112 NEUROMUSCULAR REEDUCATION: CPT | Performed by: PHYSICAL THERAPIST

## 2020-09-03 NOTE — PROGRESS NOTES
Daily Note     Today's date: 9/3/2020  Patient name: Konrad Briscoe  : 1960  MRN: [id number]  Referring provider: Leonides De Leon MD  Dx:   Encounter Diagnosis     ICD-10-CM    1  Sciatica of left side  M54 32                   Subjective: Pt notes today is the best day she has had in 3 weeks  Pt denies pain and worked a 12 hour shift today  Objective: See treatment diary below  Instructed pt in disk mechanics and importance of proper posture to prevent disc derangement  Assessment: Tolerated treatment well  Patient with low back soreness abolished post TE  Pt tolerated addition of clamshells well  Good technique with all TE performed  Instructed pt in increased freqency of prone press-ups if radicular symptoms to knee return  Plan: Continue per plan of care        Precautions: Disc Herniation    Manuals 9/3/20 8/20/20 8/26/20 8/27/20 2020   LE Stretch  15 min Piriformis, calf, distraction 10 min 10 min 10 min 10min                            Neuro Re-Ed         Abd Brace 2x10 2x10 2x10 2x10 2x10   Brace with March 2x10 B/L   2x10 B/L 2x10 B/L   Bridges /10 10x 2x10 2x10 2x10   Clamshell L4 /10 ea       Hip Adduction squeeze 2/10  2x10 2x10 2x10                   Ther Ex        Standing trunk Ext        Prone lying with left S/L        YOUNG 3 x30" 3x30" 3x30" 3x30" 3x30"   PPU 1/10 no OP x10 w/ OP   10x with OP 10x                                    Ther Activity                        Gait Training                        Modalities        IFC with MHP in supine ---------> 15 min 15 min  15 min

## 2020-09-04 ENCOUNTER — OFFICE VISIT (OUTPATIENT)
Dept: NEUROSURGERY | Facility: CLINIC | Age: 60
End: 2020-09-04
Payer: COMMERCIAL

## 2020-09-04 VITALS
BODY MASS INDEX: 27.82 KG/M2 | DIASTOLIC BLOOD PRESSURE: 76 MMHG | SYSTOLIC BLOOD PRESSURE: 128 MMHG | HEIGHT: 63 IN | TEMPERATURE: 98.5 F | RESPIRATION RATE: 16 BRPM | HEART RATE: 77 BPM | WEIGHT: 157 LBS

## 2020-09-04 DIAGNOSIS — M54.32 SCIATICA OF LEFT SIDE: Primary | ICD-10-CM

## 2020-09-04 DIAGNOSIS — M54.42 ACUTE LEFT-SIDED LOW BACK PAIN WITH LEFT-SIDED SCIATICA: ICD-10-CM

## 2020-09-04 PROCEDURE — 99214 OFFICE O/P EST MOD 30 MIN: CPT | Performed by: RADIOLOGY

## 2020-09-04 NOTE — PROGRESS NOTES
Assessment/Plan:     Diagnoses and all orders for this visit:    Sciatica of left side    Acute left-sided low back pain with left-sided sciatica        Discussion Summary:   Gladys Garcia is doing much better since her initial consultation  Due to her ongoing weakness, though mild, we discussed CARL and then potentially surgery  She reports she is doing much better and would like to see how she responds to therapy in the next 2-3 weeks  She will then contact the office to report on her progress and determine next course of action  The patient, patient's family was counseled regarding diagnostic results, instructions for management  Total time of encounter was 60 minutes and 35 minutes was spent counseling  Chief Complaint: Follow-up      Patient ID: Mark Schofield is a 64 y o  female    HPI    Ms  Lucina Cruz returns for follow-up of her left sided sciatica  She reports significant improvement in pain since starting physical therapy and multimodal medication regimen  She has residual numbness centered over her left knee  States some mild weakness noticed when trying to climb stairs but otherwise much improved  No bowel or bladder problems  Review of Systems   Constitutional: Negative  HENT: Negative  Eyes: Negative  Respiratory: Negative  Cardiovascular: Negative  Gastrointestinal: Negative  Endocrine: Negative  Genitourinary: Negative  Musculoskeletal: Positive for back pain (getting better)  Negative for gait problem, myalgias, neck pain and neck stiffness  Skin: Negative  Allergic/Immunologic: Negative  Neurological: Positive for weakness (left leg) and numbness (left knee)  Negative for dizziness, tremors, seizures, syncope, light-headedness and headaches  Hematological: Negative  Psychiatric/Behavioral: Negative  I have personally reviewed the MA's review of systems and made changes as necessary      The following portions of the patient's history were reviewed and updated as appropriate: allergies, current medications, past family history, past medical history, past social history, past surgical history and problem list       Active Ambulatory Problems     Diagnosis Date Noted    Sciatica of left side 08/14/2020    Lower back pain 08/17/2020     Resolved Ambulatory Problems     Diagnosis Date Noted    Appendicitis 08/14/2020     Past Medical History:   Diagnosis Date    Anxiety     Hyperlipemia     Psychiatric disorder     Sciatic leg pain        Past Surgical History:   Procedure Laterality Date    CHOLECYSTECTOMY               Vitals:    09/04/20 1036   BP: 128/76   Pulse: 77   Resp: 16   Temp: 98 5 °F (36 9 °C)         Objective:    Physical Exam  Constitutional:       Appearance: Normal appearance  HENT:      Head: Normocephalic and atraumatic  Eyes:      Extraocular Movements: Extraocular movements intact  Pupils: Pupils are equal, round, and reactive to light  Pulmonary:      Effort: Pulmonary effort is normal    Musculoskeletal: Normal range of motion  General: No swelling, tenderness, deformity or signs of injury  Right lower leg: No edema  Left lower leg: No edema  Skin:     General: Skin is warm and dry  Neurological:      Mental Status: She is alert and oriented to person, place, and time  Cranial Nerves: No cranial nerve deficit  Sensory: Sensory deficit present  Motor: Weakness present  Coordination: Coordination normal       Gait: Gait normal       Comments: Mild left hip and knee weakness  Numbness to LT over left knee   Psychiatric:         Mood and Affect: Mood normal          Behavior: Behavior normal          Thought Content: Thought content normal          Judgment: Judgment normal        Neurologic Exam     Mental Status   Oriented to person, place, and time  Cranial Nerves     CN III, IV, VI   Pupils are equal, round, and reactive to light        Results/Data:  I have reviewed the results and images from the MRI in detail with the patient

## 2020-09-09 ENCOUNTER — OFFICE VISIT (OUTPATIENT)
Dept: PHYSICAL THERAPY | Facility: CLINIC | Age: 60
End: 2020-09-09
Payer: COMMERCIAL

## 2020-09-09 DIAGNOSIS — M54.32 SCIATICA OF LEFT SIDE: Primary | ICD-10-CM

## 2020-09-09 PROCEDURE — 97110 THERAPEUTIC EXERCISES: CPT | Performed by: PHYSICAL THERAPIST

## 2020-09-09 PROCEDURE — 97014 ELECTRIC STIMULATION THERAPY: CPT | Performed by: PHYSICAL THERAPIST

## 2020-09-09 PROCEDURE — 97112 NEUROMUSCULAR REEDUCATION: CPT | Performed by: PHYSICAL THERAPIST

## 2020-09-09 PROCEDURE — 97140 MANUAL THERAPY 1/> REGIONS: CPT | Performed by: PHYSICAL THERAPIST

## 2020-09-09 NOTE — PROGRESS NOTES
Daily Note     Today's date: 2020  Patient name: Yudith Fletcher  : 1960  MRN: [id number]  Referring provider: Manolo English MD  Dx:   Encounter Diagnosis     ICD-10-CM    1  Sciatica of left side  M54 32                   Subjective: Patient states "I have times when I don't have pain and other times the leg is bothering me"  Objective: See treatment diary below      Assessment: Tolerated treatment well  Patient exhibited good technique with therapeutic exercises  Patient with improving lumbar stability noted  Plan: Continue per plan of care        Precautions: Disc Herniation    Manuals 9/3/20 9/9/20 8/26/20 8/27/20 2020   LE Stretch  15 min 10 min 10 min 10 min 10min                            Neuro Re-Ed         Abd Brace 2x10 3x10 2x10 2x10 2x10   Brace with March 2x10 B/L 3x10 B/L  2x10 B/L 2x10 B/L   Bridges /10 3x10 2x10 2x10 2x10   Clamshell L4 2/10 ea L4 2x10 each      Hip Adduction squeeze /10 3x10 2x10 2x10 2x10                   Ther Ex        Standing trunk Ext        Prone lying with left S/L        YOUNG 3 x30" 3x30" 3x30" 3x30" 3x30"   PPU 10 no OP x10 w/ OP 2x10  10x with OP 10x    Prone hip ext  2x10 B                              Ther Activity                        Gait Training                        Modalities        IFC with MHP in supine ---------> 15 min 15 min  15 min

## 2020-09-10 ENCOUNTER — OFFICE VISIT (OUTPATIENT)
Dept: PHYSICAL THERAPY | Facility: CLINIC | Age: 60
End: 2020-09-10
Payer: COMMERCIAL

## 2020-09-10 DIAGNOSIS — M54.32 SCIATICA OF LEFT SIDE: Primary | ICD-10-CM

## 2020-09-10 DIAGNOSIS — M54.32 SCIATICA OF LEFT SIDE: ICD-10-CM

## 2020-09-10 PROCEDURE — 97110 THERAPEUTIC EXERCISES: CPT | Performed by: PHYSICAL THERAPIST

## 2020-09-10 PROCEDURE — 97140 MANUAL THERAPY 1/> REGIONS: CPT | Performed by: PHYSICAL THERAPIST

## 2020-09-10 PROCEDURE — 97014 ELECTRIC STIMULATION THERAPY: CPT | Performed by: PHYSICAL THERAPIST

## 2020-09-10 PROCEDURE — 97112 NEUROMUSCULAR REEDUCATION: CPT | Performed by: PHYSICAL THERAPIST

## 2020-09-10 NOTE — PROGRESS NOTES
Daily Note     Today's date: 9/10/2020  Patient name: Alexandria Moss  : 1960  MRN: [id number]  Referring provider: Carlos Love MD  Dx:   Encounter Diagnosis     ICD-10-CM    1  Sciatica of left side  M54 32                   Subjective: "I'm doing really good today  It's not bothering me today"  Objective: See treatment diary below      Assessment: Tolerated treatment well  Patient exhibited good technique with therapeutic exercises  No increased pain reported with treatment today  Plan: Continue per plan of care        Precautions: Disc Herniation    Manuals 9/3/20 9/9/20 9/10/20 8/27/20 2020   LE Stretch  15 min 10 min 10 min 10 min 10min                            Neuro Re-Ed         Abd Brace 2x10 3x10 3x10 2x10 2x10   Brace with March 2x10 B/L 3x10 B/L 3x10 B/L 2x10 B/L 2x10 B/L   Bridges /10 3x10 3x10 2x10 2x10   Clamshell L4 2/10 ea L4 2x10 each 2x10 each     Hip Adduction squeeze /10 3x10 3x10 2x10 2x10                   Ther Ex        Standing trunk Ext        Prone lying with left S/L        YOUNG 3 x30" 3x30" 3x1 min 3x30" 3x30"   PPU 1/10 no OP x10 w/ OP 2x10 2x10 10x with OP 10x    Prone hip ext  2x10 B 2x10 B                             Ther Activity                        Gait Training                        Modalities        IFC with MHP in supine ---------> 15 min 15 min  15 min

## 2020-09-10 NOTE — TELEPHONE ENCOUNTER
Patient called the nurse line requesting a refill of her robaxin and gabapentin  Okay to refill at this time? Patient states that she is almost out of her gabapentin completely

## 2020-09-11 RX ORDER — METHOCARBAMOL 500 MG/1
500 TABLET, FILM COATED ORAL 4 TIMES DAILY
Qty: 120 TABLET | Refills: 0 | Status: SHIPPED | OUTPATIENT
Start: 2020-09-11 | End: 2022-05-26

## 2020-09-11 RX ORDER — GABAPENTIN 100 MG/1
100 CAPSULE ORAL 3 TIMES DAILY
Qty: 90 CAPSULE | Refills: 0 | Status: SHIPPED | OUTPATIENT
Start: 2020-09-11 | End: 2022-05-26

## 2020-09-14 ENCOUNTER — OFFICE VISIT (OUTPATIENT)
Dept: PHYSICAL THERAPY | Facility: CLINIC | Age: 60
End: 2020-09-14
Payer: COMMERCIAL

## 2020-09-14 DIAGNOSIS — M54.32 SCIATICA OF LEFT SIDE: Primary | ICD-10-CM

## 2020-09-14 PROCEDURE — 97140 MANUAL THERAPY 1/> REGIONS: CPT | Performed by: PHYSICAL THERAPIST

## 2020-09-14 PROCEDURE — 97110 THERAPEUTIC EXERCISES: CPT | Performed by: PHYSICAL THERAPIST

## 2020-09-14 PROCEDURE — 97112 NEUROMUSCULAR REEDUCATION: CPT | Performed by: PHYSICAL THERAPIST

## 2020-09-14 NOTE — PROGRESS NOTES
Daily Note     Today's date: 2020  Patient name: Daniel Garcia  : 1960  MRN: [id number]  Referring provider: Armani Thornton MD  Dx:   Encounter Diagnosis     ICD-10-CM    1  Sciatica of left side  M54 32                   Subjective: Patient states "I feel good today and I felt good over the weekend"  Objective: See treatment diary below      Assessment: Tolerated treatment well  Patient exhibited good technique with therapeutic exercises      Plan: Continue per plan of care        Precautions: Disc Herniation    Manuals 9/3/20 9/9/20 9/10/20 9/14/20 2020   LE Stretch  15 min 10 min 10 min 10 min 10min                            Neuro Re-Ed         Abd Brace 2x10 3x10 3x10 3x10 2x10   Brace with March 2x10 B/L 3x10 B/L 3x10 B/L 3x10 B/L 2x10 B/L   Bridges 2/10 3x10 3x10 3x10 2x10   Clamshell L4 10 ea L4 2x10 each 2x10 each 2x10 each    Hip Adduction squeeze 2/10 3x10 3x10 3x10 2x10                   Ther Ex        Standing trunk Ext        Prone lying with left S/L        YOUNG 3 x30" 3x30" 3x1 min 3x 1 min 3x30"   PPU 1/10 no OP x10 w/ OP 2x10 2x10 20x with OP 10x    Prone hip ext  2x10 B 2x10 B 2x10 B                            Ther Activity                        Gait Training                        Modalities        IFC with MHP in supine ---------> 15 min 15 min Declined 15 min

## 2020-09-16 ENCOUNTER — APPOINTMENT (OUTPATIENT)
Dept: PHYSICAL THERAPY | Facility: CLINIC | Age: 60
End: 2020-09-16
Payer: COMMERCIAL

## 2020-09-17 ENCOUNTER — OFFICE VISIT (OUTPATIENT)
Dept: PHYSICAL THERAPY | Facility: CLINIC | Age: 60
End: 2020-09-17
Payer: COMMERCIAL

## 2020-09-23 ENCOUNTER — OFFICE VISIT (OUTPATIENT)
Dept: PHYSICAL THERAPY | Facility: CLINIC | Age: 60
End: 2020-09-23
Payer: COMMERCIAL

## 2020-09-23 DIAGNOSIS — M54.32 SCIATICA OF LEFT SIDE: Primary | ICD-10-CM

## 2020-09-23 PROCEDURE — 97110 THERAPEUTIC EXERCISES: CPT | Performed by: PHYSICAL THERAPIST

## 2020-09-23 PROCEDURE — 97140 MANUAL THERAPY 1/> REGIONS: CPT | Performed by: PHYSICAL THERAPIST

## 2020-09-23 PROCEDURE — 97112 NEUROMUSCULAR REEDUCATION: CPT | Performed by: PHYSICAL THERAPIST

## 2020-09-23 NOTE — PROGRESS NOTES
Daily Note     Today's date: 2020  Patient name: Chelsie Lee  : 1960  MRN: [id number]  Referring provider: Garth Goldstein MD  Dx:   Encounter Diagnosis     ICD-10-CM    1  Sciatica of left side  M54 32                   Subjective: "I was doing pretty good and then I tried to wean myself off the Gabapentin  The pain in my knee came back"  Objective: See treatment diary below      Assessment: Tolerated treatment well  Patient exhibited good technique with therapeutic exercises  Lumbar stability continues to improve  Patient instructed to call her doctor about increasing Gabapentin again  Plan: Continue per plan of care        Precautions: Disc Herniation    Manuals 9/3/20 9/9/20 9/10/20 9/14/20 2020   LE Stretch  15 min 10 min 10 min 10 min 10min                            Neuro Re-Ed         Abd Brace 2x10 3x10 3x10 3x10 3x10   Brace with March 2x10 B/L 3x10 B/L 3x10 B/L 3x10 B/L 3x10 B/L   Bridges 2/10 3x10 3x10 3x10 3x10   Clamshell L4 2/10 ea L4 2x10 each 2x10 each 2x10 each 2x10   Hip Adduction squeeze 2/10 3x10 3x10 3x10 3x10                   Ther Ex        Standing trunk Ext        Prone lying with left S/L        YOUNG 3 x30" 3x30" 3x1 min 3x 1 min 3x1 min   PPU 1/10 no OP x10 w/ OP 2x10 2x10 20x with OP 20x    Prone hip ext  2x10 B 2x10 B 2x10 B 2x10 B                           Ther Activity                        Gait Training                        Modalities        IFC with MHP in supine ---------> 15 min 15 min Declined 15 min

## 2020-09-23 NOTE — PROGRESS NOTES
PT Re-Evaluation     Today's date: 2020  Patient name: Lc Ricardo  : 1960  MRN: [id number]  Referring provider: Jesus Bose MD  Dx:   Encounter Diagnosis     ICD-10-CM    1  Sciatica of left side  M54 32                   Assessment  Assessment details: Patient has made progress over first 4 weeks of PT  Patient has been feeling improved however today presents to PT with reports of increasing pain in left knee  Patient is reporting significant improvement in her pain and symptoms compared to initial eval level  Patient reports she has begun weaning herself from taking Gabapentin and thinks this could be contributing to increased symptoms  Patient has made gains with lumbar and core stability along with LE strength  Patient would benefit from continued PT intervention with focus on improving stabilization and strength and relieving pain in order to maximize functional independence  Impairments: abnormal gait, abnormal muscle tone, activity intolerance, impaired physical strength, lacks appropriate home exercise program and pain with function    Goals  ST  Initiate HEP- Met  2  Patient to report decreased pain at worst by 25% in 3 weeks- Met    LT  Patient to report decreased pain at worst by % in 6 weeks- Progressing  2  Patient to improve left LE strength to [facility] in 6 weeks- Progressing  3  Patient to improve function and work ability to [facility] in 6 weeks- Progressing  4   Decrease radicular pain by 50% in 4-6 weeks- Progressing    Plan  Patient would benefit from: skilled physical therapy  Planned modality interventions: cryotherapy, thermotherapy: hydrocollator packs, ultrasound and unattended electrical stimulation  Other planned modality interventions: Modalities PRN  Planned therapy interventions: abdominal trunk stabilization, joint mobilization, manual therapy, massage, patient education, strengthening, stretching, therapeutic activities, therapeutic exercise, therapeutic training, functional ROM exercises, flexibility, graded activity, graded exercise and home exercise program  Frequency: 2x week  Duration in visits: 8  Duration in weeks: 4  Treatment plan discussed with: patient        Subjective Evaluation    History of Present Illness  Date of onset: 2020  Pain  At best pain ratin  At worst pain rating: 3  Quality: burning (Tingling)  Relieving factors: medications, ice and heat  Aggravating factors: sitting and walking  Progression: improved      Diagnostic Tests  MRI studies: abnormal (Disc Herniations most notably at L3)  Patient Goals  Patient goals for therapy: decreased pain          Objective     Concurrent Complaints  Negative for bladder dysfunction, bowel dysfunction and saddle (S4) numbness    Palpation   Left   Hypertonic in the lumbar paraspinals  Muscle spasm in the lumbar paraspinals  Tenderness     Additional Tenderness Details  Patient with TTP at left lumbar sacral junction area    Neurological Testing     Sensation     Lumbar   Left   Diminished: light touch  Paresthesia: light touch    Right   Intact: light touch    Reflexes   Left   Patellar (L4): normal (2+)  Achilles (S1): normal (2+)    Right   Patellar (L4): normal (2+)  Achilles (S1): normal (2+)    Active Range of Motion     Lumbar   Normal active range of motion  Mechanical Assessment    Cervical      Thoracic      Lumbar    Standing flexion:   Pain location:no change  Standing extension:   Pain intensity: worse    Strength/Myotome Testing     Lumbar     Right   Normal strength    Left Hip   Planes of Motion   Flexion: 4+  Abduction: 5  Adduction: 5    Left Knee   Flexion: 4+  Extension: 4+    Left Ankle/Foot   Dorsiflexion: 4+    Tests     Lumbar     Left   Positive passive SLR  Left Hip   Negative LEIF, FADIR and long sit  Right Hip   Negative long sit       Ambulation     Observational Gait   Gait: within functional limits and antalgic   Left stance time and left step length within functional limits  Quality of Movement During Gait     Knee    Knee (Left): Positive stiff knee       General Comments:    Lower quarter screen   Hips: unremarkable  Knees: unremarkable  Foot/ankle: unremarkable             Precautions: Disc Herniation

## 2020-09-24 ENCOUNTER — OFFICE VISIT (OUTPATIENT)
Dept: PHYSICAL THERAPY | Facility: CLINIC | Age: 60
End: 2020-09-24
Payer: COMMERCIAL

## 2020-09-24 DIAGNOSIS — M54.32 SCIATICA OF LEFT SIDE: Primary | ICD-10-CM

## 2020-09-24 PROCEDURE — 97110 THERAPEUTIC EXERCISES: CPT | Performed by: PHYSICAL THERAPIST

## 2020-09-24 PROCEDURE — 97112 NEUROMUSCULAR REEDUCATION: CPT | Performed by: PHYSICAL THERAPIST

## 2020-09-24 PROCEDURE — 97140 MANUAL THERAPY 1/> REGIONS: CPT | Performed by: PHYSICAL THERAPIST

## 2020-09-24 NOTE — PROGRESS NOTES
Daily Note     Today's date: 2020  Patient name: Raine Bazan  : 1960  MRN: [id number]  Referring provider: Lori Barboza MD  Dx:   Encounter Diagnosis     ICD-10-CM    1  Sciatica of left side  M54 32                   Subjective: "My back is sore today  My knee feels better"  Objective: See treatment diary below  Manuals 9/24/20 9/9/20 9/10/20 9/14/20 2020   LE Stretch  15 min 10 min 10 min 10 min 10min                            Neuro Re-Ed         Abd Brace 3x10 3x10 3x10 3x10 3x10   Brace with March 3x10 B/L 3x10 B/L 3x10 B/L 3x10 B/L 3x10 B/L   Bridges 3x10 3x10 3x10 3x10 3x10   Clamshell L4 3x10 ea L4 2x10 each 2x10 each 2x10 each 2x10   Hip Adduction squeeze 3x10 3x10 3x10 3x10 3x10                   Ther Ex        Standing trunk Ext        Prone lying with left S/L        YOUNG Held 3x30" 3x1 min 3x 1 min 3x1 min   PPU Held 2x10 2x10 20x with OP 20x    Prone hip ext 2x10 B 2x10 B 2x10 B 2x10 B 2x10 B                           Ther Activity                        Gait Training                        Modalities        IFC with MHP in supine Pt declined 15 min 15 min Declined 15 min                Assessment: Tolerated treatment well  Patient exhibited good technique with therapeutic exercises  PT held PPU and YOUNG today due to back soreness  Plan: Continue per plan of care        Precautions: Disc Herniation

## 2020-09-30 ENCOUNTER — OFFICE VISIT (OUTPATIENT)
Dept: PHYSICAL THERAPY | Facility: CLINIC | Age: 60
End: 2020-09-30
Payer: COMMERCIAL

## 2020-09-30 DIAGNOSIS — M54.32 SCIATICA OF LEFT SIDE: Primary | ICD-10-CM

## 2020-09-30 PROCEDURE — 97110 THERAPEUTIC EXERCISES: CPT

## 2020-09-30 PROCEDURE — 97140 MANUAL THERAPY 1/> REGIONS: CPT

## 2020-09-30 NOTE — PROGRESS NOTES
Daily Note     Today's date: 2020  Patient name: Tito Bowman  : 1960  MRN: [id number]  Referring provider: Patito Gillis MD  Dx:   Encounter Diagnosis     ICD-10-CM    1  Sciatica of left side  M54 32                   Subjective: The patient states that she is ok  Objective: See treatment diary below      Assessment: Tolerated treatment well  Patient exhibited good technique with therapeutic exercises and would benefit from continued PT  The patient had good ROM  Plan: Continue per plan of care        Precautions: Disc Herniation    Manuals 9/24/20 9/30/20 9/10/20 9/14/20 2020   LE Stretch  15 min 15 min by supervising therapist 10 min 10 min 10min                                              Neuro Re-Ed              Abd Brace 3x10 3x10 3x10 3x10 3x10   Brace with March 3x10 B/L 3x10 B/L 3x10 B/L 3x10 B/L 3x10 B/L   Bridges 3x10 3x10 w/ ball squeeze & tband  3x10 3x10 3x10   Clamshell L4 3x10 ea 2x10 each Green TBand 2x10 each 2x10 each 2x10   Hip Adduction squeeze 3x10 3x10 3x10 3x10 3x10                               Ther Ex             Standing trunk Ext             Prone lying with left S/L             YOUNG Held 3x30" 3x1 min 3x 1 min 3x1 min   PPU Held 2x10 2x10 20x with OP 20x    Prone hip ext 2x10 B 2x10 B 2x10 B 2x10 B 2x10 B                                             Ther Activity                                         Gait Training                                         Modalities             IFC with MHP in supine Pt declined Declined 15 min Declined 15 min

## 2020-10-01 ENCOUNTER — OFFICE VISIT (OUTPATIENT)
Dept: PHYSICAL THERAPY | Facility: CLINIC | Age: 60
End: 2020-10-01
Payer: COMMERCIAL

## 2020-10-01 DIAGNOSIS — M54.32 SCIATICA OF LEFT SIDE: Primary | ICD-10-CM

## 2020-10-01 PROCEDURE — 97110 THERAPEUTIC EXERCISES: CPT | Performed by: PHYSICAL THERAPIST

## 2020-10-01 PROCEDURE — 97140 MANUAL THERAPY 1/> REGIONS: CPT | Performed by: PHYSICAL THERAPIST

## 2020-10-07 ENCOUNTER — OFFICE VISIT (OUTPATIENT)
Dept: PHYSICAL THERAPY | Facility: CLINIC | Age: 60
End: 2020-10-07
Payer: COMMERCIAL

## 2020-10-07 DIAGNOSIS — M54.32 SCIATICA OF LEFT SIDE: Primary | ICD-10-CM

## 2020-10-07 PROCEDURE — 97140 MANUAL THERAPY 1/> REGIONS: CPT | Performed by: PHYSICAL THERAPIST

## 2020-10-07 PROCEDURE — 97110 THERAPEUTIC EXERCISES: CPT | Performed by: PHYSICAL THERAPIST

## 2021-04-21 DIAGNOSIS — K38.9 DISEASE OF APPENDIX, UNSPECIFIED: ICD-10-CM

## 2022-05-26 ENCOUNTER — HOSPITAL ENCOUNTER (OUTPATIENT)
Facility: HOSPITAL | Age: 62
Setting detail: OUTPATIENT SURGERY
Discharge: HOME | End: 2022-05-27
Attending: EMERGENCY MEDICINE | Admitting: STUDENT IN AN ORGANIZED HEALTH CARE EDUCATION/TRAINING PROGRAM
Payer: COMMERCIAL

## 2022-05-26 ENCOUNTER — ANESTHESIA EVENT (EMERGENCY)
Dept: PERIOP | Facility: HOSPITAL | Age: 62
End: 2022-05-26
Payer: COMMERCIAL

## 2022-05-26 ENCOUNTER — ANESTHESIA (EMERGENCY)
Dept: PERIOP | Facility: HOSPITAL | Age: 62
End: 2022-05-26
Payer: COMMERCIAL

## 2022-05-26 ENCOUNTER — APPOINTMENT (EMERGENCY)
Dept: CT IMAGING | Facility: HOSPITAL | Age: 62
End: 2022-05-26
Payer: COMMERCIAL

## 2022-05-26 DIAGNOSIS — R10.33 PERIUMBILICAL ABDOMINAL PAIN: ICD-10-CM

## 2022-05-26 DIAGNOSIS — R10.13 EPIGASTRIC PAIN: ICD-10-CM

## 2022-05-26 DIAGNOSIS — K37 APPENDICITIS: Primary | ICD-10-CM

## 2022-05-26 LAB
ALBUMIN SERPL BCP-MCNC: 3.9 G/DL (ref 3.5–5)
ALP SERPL-CCNC: 76 U/L (ref 46–116)
ALT SERPL W P-5'-P-CCNC: 24 U/L (ref 12–78)
ANION GAP SERPL CALCULATED.3IONS-SCNC: 8 MMOL/L (ref 4–13)
AST SERPL W P-5'-P-CCNC: 16 U/L (ref 5–45)
BACTERIA UR QL AUTO: NORMAL /HPF
BASOPHILS # BLD AUTO: 0.04 THOUSANDS/ΜL (ref 0–0.1)
BASOPHILS NFR BLD AUTO: 0 % (ref 0–1)
BILIRUB SERPL-MCNC: 0.52 MG/DL (ref 0.2–1)
BILIRUB UR QL STRIP: NEGATIVE
BUN SERPL-MCNC: 14 MG/DL (ref 5–25)
CALCIUM SERPL-MCNC: 8.9 MG/DL (ref 8.3–10.1)
CARDIAC TROPONIN I PNL SERPL HS: 3 NG/L
CHLORIDE SERPL-SCNC: 106 MMOL/L (ref 100–108)
CLARITY UR: CLEAR
CO2 SERPL-SCNC: 26 MMOL/L (ref 21–32)
COLOR UR: ABNORMAL
CREAT SERPL-MCNC: 0.89 MG/DL (ref 0.6–1.3)
EOSINOPHIL # BLD AUTO: 0.04 THOUSAND/ΜL (ref 0–0.61)
EOSINOPHIL NFR BLD AUTO: 0 % (ref 0–6)
ERYTHROCYTE [DISTWIDTH] IN BLOOD BY AUTOMATED COUNT: 13 % (ref 11.6–15.1)
GFR SERPL CREATININE-BSD FRML MDRD: 71 ML/MIN/1.73SQ M
GLUCOSE SERPL-MCNC: 128 MG/DL (ref 65–140)
GLUCOSE UR STRIP-MCNC: NEGATIVE MG/DL
HCT VFR BLD AUTO: 41.9 % (ref 34.8–46.1)
HGB BLD-MCNC: 13.9 G/DL (ref 11.5–15.4)
HGB UR QL STRIP.AUTO: ABNORMAL
IMM GRANULOCYTES # BLD AUTO: 0.03 THOUSAND/UL (ref 0–0.2)
IMM GRANULOCYTES NFR BLD AUTO: 0 % (ref 0–2)
KETONES UR STRIP-MCNC: NEGATIVE MG/DL
LEUKOCYTE ESTERASE UR QL STRIP: NEGATIVE
LIPASE SERPL-CCNC: 162 U/L (ref 73–393)
LYMPHOCYTES # BLD AUTO: 1.1 THOUSANDS/ΜL (ref 0.6–4.47)
LYMPHOCYTES NFR BLD AUTO: 8 % (ref 14–44)
MCH RBC QN AUTO: 31.2 PG (ref 26.8–34.3)
MCHC RBC AUTO-ENTMCNC: 33.2 G/DL (ref 31.4–37.4)
MCV RBC AUTO: 94 FL (ref 82–98)
MONOCYTES # BLD AUTO: 0.75 THOUSAND/ΜL (ref 0.17–1.22)
MONOCYTES NFR BLD AUTO: 6 % (ref 4–12)
NEUTROPHILS # BLD AUTO: 11.56 THOUSANDS/ΜL (ref 1.85–7.62)
NEUTS SEG NFR BLD AUTO: 86 % (ref 43–75)
NITRITE UR QL STRIP: NEGATIVE
NON-SQ EPI CELLS URNS QL MICRO: NORMAL /HPF
NRBC BLD AUTO-RTO: 0 /100 WBCS
OTHER STN SPEC: NORMAL
PH UR STRIP.AUTO: 6 [PH]
PLATELET # BLD AUTO: 249 THOUSANDS/UL (ref 149–390)
PMV BLD AUTO: 10.5 FL (ref 8.9–12.7)
POTASSIUM SERPL-SCNC: 4 MMOL/L (ref 3.5–5.3)
PROT SERPL-MCNC: 6.8 G/DL (ref 6.4–8.2)
PROT UR STRIP-MCNC: NEGATIVE MG/DL
RBC # BLD AUTO: 4.45 MILLION/UL (ref 3.81–5.12)
RBC #/AREA URNS AUTO: NORMAL /HPF
SODIUM SERPL-SCNC: 140 MMOL/L (ref 136–145)
SP GR UR STRIP.AUTO: 1.01 (ref 1–1.03)
UROBILINOGEN UR QL STRIP.AUTO: 0.2 E.U./DL
WBC # BLD AUTO: 13.52 THOUSAND/UL (ref 4.31–10.16)
WBC #/AREA URNS AUTO: NORMAL /HPF

## 2022-05-26 PROCEDURE — G1004 CDSM NDSC: HCPCS

## 2022-05-26 PROCEDURE — 84484 ASSAY OF TROPONIN QUANT: CPT | Performed by: EMERGENCY MEDICINE

## 2022-05-26 PROCEDURE — 96374 THER/PROPH/DIAG INJ IV PUSH: CPT

## 2022-05-26 PROCEDURE — 81001 URINALYSIS AUTO W/SCOPE: CPT | Performed by: EMERGENCY MEDICINE

## 2022-05-26 PROCEDURE — 80053 COMPREHEN METABOLIC PANEL: CPT | Performed by: EMERGENCY MEDICINE

## 2022-05-26 PROCEDURE — 88304 TISSUE EXAM BY PATHOLOGIST: CPT | Performed by: PATHOLOGY

## 2022-05-26 PROCEDURE — 36415 COLL VENOUS BLD VENIPUNCTURE: CPT | Performed by: EMERGENCY MEDICINE

## 2022-05-26 PROCEDURE — 99285 EMERGENCY DEPT VISIT HI MDM: CPT

## 2022-05-26 PROCEDURE — 93005 ELECTROCARDIOGRAM TRACING: CPT

## 2022-05-26 PROCEDURE — 99285 EMERGENCY DEPT VISIT HI MDM: CPT | Performed by: EMERGENCY MEDICINE

## 2022-05-26 PROCEDURE — 74176 CT ABD & PELVIS W/O CONTRAST: CPT

## 2022-05-26 PROCEDURE — 44970 LAPAROSCOPY APPENDECTOMY: CPT

## 2022-05-26 PROCEDURE — 83690 ASSAY OF LIPASE: CPT | Performed by: EMERGENCY MEDICINE

## 2022-05-26 PROCEDURE — 85025 COMPLETE CBC W/AUTO DIFF WBC: CPT | Performed by: EMERGENCY MEDICINE

## 2022-05-26 RX ORDER — MIDAZOLAM HYDROCHLORIDE 2 MG/2ML
INJECTION, SOLUTION INTRAMUSCULAR; INTRAVENOUS AS NEEDED
Status: DISCONTINUED | OUTPATIENT
Start: 2022-05-26 | End: 2022-05-26

## 2022-05-26 RX ORDER — FLUTICASONE PROPIONATE 50 MCG
2 SPRAY, SUSPENSION (ML) NASAL AS NEEDED
COMMUNITY
Start: 2022-02-21

## 2022-05-26 RX ORDER — KETOROLAC TROMETHAMINE 30 MG/ML
INJECTION, SOLUTION INTRAMUSCULAR; INTRAVENOUS AS NEEDED
Status: DISCONTINUED | OUTPATIENT
Start: 2022-05-26 | End: 2022-05-26

## 2022-05-26 RX ORDER — IBUPROFEN 600 MG/1
600 TABLET ORAL EVERY 6 HOURS PRN
Status: DISCONTINUED | OUTPATIENT
Start: 2022-05-26 | End: 2022-05-27 | Stop reason: HOSPADM

## 2022-05-26 RX ORDER — DEXMEDETOMIDINE HYDROCHLORIDE 100 UG/ML
INJECTION, SOLUTION INTRAVENOUS AS NEEDED
Status: DISCONTINUED | OUTPATIENT
Start: 2022-05-26 | End: 2022-05-26

## 2022-05-26 RX ORDER — CEFAZOLIN SODIUM 1 G/50ML
1000 SOLUTION INTRAVENOUS ONCE
Status: DISCONTINUED | OUTPATIENT
Start: 2022-05-26 | End: 2022-05-26

## 2022-05-26 RX ORDER — MORPHINE SULFATE 4 MG/ML
4 INJECTION, SOLUTION INTRAMUSCULAR; INTRAVENOUS
Status: DISCONTINUED | OUTPATIENT
Start: 2022-05-26 | End: 2022-05-27 | Stop reason: HOSPADM

## 2022-05-26 RX ORDER — ACETAMINOPHEN 325 MG/1
650 TABLET ORAL EVERY 6 HOURS PRN
Status: DISCONTINUED | OUTPATIENT
Start: 2022-05-26 | End: 2022-05-27 | Stop reason: HOSPADM

## 2022-05-26 RX ORDER — SUCCINYLCHOLINE/SOD CL,ISO/PF 100 MG/5ML
SYRINGE (ML) INTRAVENOUS AS NEEDED
Status: DISCONTINUED | OUTPATIENT
Start: 2022-05-26 | End: 2022-05-26

## 2022-05-26 RX ORDER — ONDANSETRON 2 MG/ML
4 INJECTION INTRAMUSCULAR; INTRAVENOUS ONCE AS NEEDED
Status: DISCONTINUED | OUTPATIENT
Start: 2022-05-26 | End: 2022-05-26 | Stop reason: HOSPADM

## 2022-05-26 RX ORDER — METRONIDAZOLE 500 MG/100ML
500 INJECTION, SOLUTION INTRAVENOUS
Status: DISCONTINUED | OUTPATIENT
Start: 2022-05-26 | End: 2022-05-26

## 2022-05-26 RX ORDER — FENTANYL CITRATE/PF 50 MCG/ML
25 SYRINGE (ML) INJECTION
Status: DISCONTINUED | OUTPATIENT
Start: 2022-05-26 | End: 2022-05-26 | Stop reason: HOSPADM

## 2022-05-26 RX ORDER — PROPOFOL 10 MG/ML
INJECTION, EMULSION INTRAVENOUS AS NEEDED
Status: DISCONTINUED | OUTPATIENT
Start: 2022-05-26 | End: 2022-05-26

## 2022-05-26 RX ORDER — FAMOTIDINE 20 MG/1
20 TABLET, FILM COATED ORAL ONCE
Status: COMPLETED | OUTPATIENT
Start: 2022-05-26 | End: 2022-05-26

## 2022-05-26 RX ORDER — ONDANSETRON 2 MG/ML
INJECTION INTRAMUSCULAR; INTRAVENOUS AS NEEDED
Status: DISCONTINUED | OUTPATIENT
Start: 2022-05-26 | End: 2022-05-26

## 2022-05-26 RX ORDER — PROPOFOL 10 MG/ML
INJECTION, EMULSION INTRAVENOUS CONTINUOUS PRN
Status: DISCONTINUED | OUTPATIENT
Start: 2022-05-26 | End: 2022-05-26

## 2022-05-26 RX ORDER — HYDROMORPHONE HCL/PF 1 MG/ML
0.25 SYRINGE (ML) INJECTION
Status: DISCONTINUED | OUTPATIENT
Start: 2022-05-26 | End: 2022-05-26 | Stop reason: HOSPADM

## 2022-05-26 RX ORDER — ROCURONIUM BROMIDE 10 MG/ML
INJECTION, SOLUTION INTRAVENOUS AS NEEDED
Status: DISCONTINUED | OUTPATIENT
Start: 2022-05-26 | End: 2022-05-26

## 2022-05-26 RX ORDER — SODIUM CHLORIDE 9 MG/ML
125 INJECTION, SOLUTION INTRAVENOUS CONTINUOUS
Status: DISCONTINUED | OUTPATIENT
Start: 2022-05-26 | End: 2022-05-27 | Stop reason: HOSPADM

## 2022-05-26 RX ORDER — BUPIVACAINE HYDROCHLORIDE AND EPINEPHRINE 2.5; 5 MG/ML; UG/ML
INJECTION, SOLUTION INFILTRATION; PERINEURAL AS NEEDED
Status: DISCONTINUED | OUTPATIENT
Start: 2022-05-26 | End: 2022-05-26 | Stop reason: HOSPADM

## 2022-05-26 RX ORDER — DEXAMETHASONE SODIUM PHOSPHATE 10 MG/ML
INJECTION, SOLUTION INTRAMUSCULAR; INTRAVENOUS AS NEEDED
Status: DISCONTINUED | OUTPATIENT
Start: 2022-05-26 | End: 2022-05-26

## 2022-05-26 RX ORDER — DIPHENHYDRAMINE HYDROCHLORIDE 50 MG/ML
12.5 INJECTION INTRAMUSCULAR; INTRAVENOUS ONCE AS NEEDED
Status: DISCONTINUED | OUTPATIENT
Start: 2022-05-26 | End: 2022-05-26 | Stop reason: HOSPADM

## 2022-05-26 RX ORDER — FENTANYL CITRATE 50 UG/ML
50 INJECTION, SOLUTION INTRAMUSCULAR; INTRAVENOUS ONCE
Status: COMPLETED | OUTPATIENT
Start: 2022-05-26 | End: 2022-05-26

## 2022-05-26 RX ORDER — LIDOCAINE HYDROCHLORIDE 10 MG/ML
INJECTION, SOLUTION EPIDURAL; INFILTRATION; INTRACAUDAL; PERINEURAL AS NEEDED
Status: DISCONTINUED | OUTPATIENT
Start: 2022-05-26 | End: 2022-05-26

## 2022-05-26 RX ORDER — SODIUM CHLORIDE, SODIUM LACTATE, POTASSIUM CHLORIDE, CALCIUM CHLORIDE 600; 310; 30; 20 MG/100ML; MG/100ML; MG/100ML; MG/100ML
INJECTION, SOLUTION INTRAVENOUS CONTINUOUS PRN
Status: DISCONTINUED | OUTPATIENT
Start: 2022-05-26 | End: 2022-05-26

## 2022-05-26 RX ORDER — FENTANYL CITRATE 50 UG/ML
INJECTION, SOLUTION INTRAMUSCULAR; INTRAVENOUS AS NEEDED
Status: DISCONTINUED | OUTPATIENT
Start: 2022-05-26 | End: 2022-05-26

## 2022-05-26 RX ORDER — SCOLOPAMINE TRANSDERMAL SYSTEM 1 MG/1
1 PATCH, EXTENDED RELEASE TRANSDERMAL
Status: DISCONTINUED | OUTPATIENT
Start: 2022-05-26 | End: 2022-05-26

## 2022-05-26 RX ORDER — CEFAZOLIN SODIUM 2 G/50ML
2000 SOLUTION INTRAVENOUS
Status: COMPLETED | OUTPATIENT
Start: 2022-05-26 | End: 2022-05-26

## 2022-05-26 RX ORDER — METRONIDAZOLE 500 MG/1
500 TABLET ORAL ONCE
Status: DISCONTINUED | OUTPATIENT
Start: 2022-05-26 | End: 2022-05-26

## 2022-05-26 RX ORDER — CHLORAL HYDRATE 500 MG
CAPSULE ORAL DAILY
COMMUNITY

## 2022-05-26 RX ADMIN — SODIUM CHLORIDE, SODIUM LACTATE, POTASSIUM CHLORIDE, AND CALCIUM CHLORIDE: .6; .31; .03; .02 INJECTION, SOLUTION INTRAVENOUS at 14:00

## 2022-05-26 RX ADMIN — FENTANYL CITRATE 50 MCG: 0.05 INJECTION, SOLUTION INTRAMUSCULAR; INTRAVENOUS at 14:03

## 2022-05-26 RX ADMIN — DEXMEDETOMIDINE HCL 10 MCG: 100 INJECTION INTRAVENOUS at 14:34

## 2022-05-26 RX ADMIN — PROPOFOL 150 MCG/KG/MIN: 10 INJECTION, EMULSION INTRAVENOUS at 14:07

## 2022-05-26 RX ADMIN — FENTANYL CITRATE 50 MCG: 0.05 INJECTION, SOLUTION INTRAMUSCULAR; INTRAVENOUS at 14:08

## 2022-05-26 RX ADMIN — SODIUM CHLORIDE 125 ML/HR: 0.9 INJECTION, SOLUTION INTRAVENOUS at 15:49

## 2022-05-26 RX ADMIN — MIDAZOLAM 2 MG: 1 INJECTION INTRAMUSCULAR; INTRAVENOUS at 14:00

## 2022-05-26 RX ADMIN — DEXAMETHASONE SODIUM PHOSPHATE 10 MG: 10 INJECTION INTRAMUSCULAR; INTRAVENOUS at 14:05

## 2022-05-26 RX ADMIN — ONDANSETRON 4 MG: 2 INJECTION INTRAMUSCULAR; INTRAVENOUS at 14:15

## 2022-05-26 RX ADMIN — FAMOTIDINE 20 MG: 20 TABLET, FILM COATED ORAL at 10:00

## 2022-05-26 RX ADMIN — KETOROLAC TROMETHAMINE 30 MG: 30 INJECTION, SOLUTION INTRAMUSCULAR at 14:47

## 2022-05-26 RX ADMIN — SUGAMMADEX 200 MG: 100 INJECTION, SOLUTION INTRAVENOUS at 14:52

## 2022-05-26 RX ADMIN — LIDOCAINE HYDROCHLORIDE 50 MG: 10 INJECTION, SOLUTION EPIDURAL; INFILTRATION; INTRACAUDAL at 14:05

## 2022-05-26 RX ADMIN — Medication 100 MG: at 14:05

## 2022-05-26 RX ADMIN — PROPOFOL 150 MG: 10 INJECTION, EMULSION INTRAVENOUS at 14:05

## 2022-05-26 RX ADMIN — DEXMEDETOMIDINE HCL 10 MCG: 100 INJECTION INTRAVENOUS at 14:37

## 2022-05-26 RX ADMIN — CEFAZOLIN SODIUM 2000 MG: 2 SOLUTION INTRAVENOUS at 13:59

## 2022-05-26 RX ADMIN — ROCURONIUM BROMIDE 40 MG: 50 INJECTION, SOLUTION INTRAVENOUS at 14:15

## 2022-05-26 RX ADMIN — SCOPALAMINE 1 PATCH: 1 PATCH, EXTENDED RELEASE TRANSDERMAL at 13:54

## 2022-05-26 RX ADMIN — SODIUM CHLORIDE 125 ML/HR: 0.9 INJECTION, SOLUTION INTRAVENOUS at 22:20

## 2022-05-26 RX ADMIN — MORPHINE SULFATE 4 MG: 4 INJECTION INTRAVENOUS at 22:32

## 2022-05-26 RX ADMIN — FENTANYL CITRATE 50 MCG: 0.05 INJECTION, SOLUTION INTRAMUSCULAR; INTRAVENOUS at 11:31

## 2022-05-26 NOTE — PLAN OF CARE
Problem: Potential for Falls  Goal: Patient will remain free of falls  Description: INTERVENTIONS:  - Educate patient/family on patient safety including physical limitations  - Instruct patient to call for assistance with activity   - Consult OT/PT to assist with strengthening/mobility   - Keep Call bell within reach  - Keep bed low and locked with side rails adjusted as appropriate  - Keep care items and personal belongings within reach  - Initiate and maintain comfort rounds  - Make Fall Risk Sign visible to staff    - Apply yellow socks and bracelet for high fall risk patients  - Consider moving patient to room near nurses station  Outcome: Progressing     Problem: PAIN - ADULT  Goal: Verbalizes/displays adequate comfort level or baseline comfort level  Description: Interventions:  - Encourage patient to monitor pain and request assistance  - Assess pain using appropriate pain scale  - Administer analgesics based on type and severity of pain and evaluate response  - Implement non-pharmacological measures as appropriate and evaluate response  - Consider cultural and social influences on pain and pain management  - Notify physician/advanced practitioner if interventions unsuccessful or patient reports new pain  Outcome: Progressing     Problem: INFECTION - ADULT  Goal: Absence or prevention of progression during hospitalization  Description: INTERVENTIONS:  - Assess and monitor for signs and symptoms of infection  - Monitor lab/diagnostic results  - Monitor all insertion sites, i e  indwelling lines, tubes, and drains  - Monitor endotracheal if appropriate and nasal secretions for changes in amount and color  - Coin appropriate cooling/warming therapies per order  - Administer medications as ordered  - Instruct and encourage patient and family to use good hand hygiene technique  - Identify and instruct in appropriate isolation precautions for identified infection/condition  Outcome: Progressing     Problem: SAFETY ADULT  Goal: Patient will remain free of falls  Description: INTERVENTIONS:  - Educate patient/family on patient safety including physical limitations  - Instruct patient to call for assistance with activity   - Consult OT/PT to assist with strengthening/mobility   - Keep Call bell within reach  - Keep bed low and locked with side rails adjusted as appropriate  - Keep care items and personal belongings within reach  - Initiate and maintain comfort rounds  - Make Fall Risk Sign visible to staff    - Apply yellow socks and bracelet for high fall risk patients  - Consider moving patient to room near nurses station  Outcome: Progressing  Goal: Maintain or return to baseline ADL function  Description: INTERVENTIONS:  -  Assess patient's ability to carry out ADLs; assess patient's baseline for ADL function and identify physical deficits which impact ability to perform ADLs (bathing, care of mouth/teeth, toileting, grooming, dressing, etc )  - Assess/evaluate cause of self-care deficits   - Assess range of motion  - Assess patient's mobility; develop plan if impaired  - Assess patient's need for assistive devices and provide as appropriate  - Encourage maximum independence but intervene and supervise when necessary  - Involve family in performance of ADLs  - Assess for home care needs following discharge   - Consider OT consult to assist with ADL evaluation and planning for discharge  - Provide patient education as appropriate  Outcome: Progressing  Goal: Maintains/Returns to pre admission functional level  Description: INTERVENTIONS:  - Perform BMAT or MOVE assessment daily    - Set and communicate daily mobility goal to care team and patient/family/caregiver     - Collaborate with rehabilitation services on mobility goals if consulted    - Out of bed for toileting  - Record patient progress and toleration of activity level   Outcome: Progressing     Problem: DISCHARGE PLANNING  Goal: Discharge to home or other facility with appropriate resources  Description: INTERVENTIONS:  - Identify barriers to discharge w/patient and caregiver  - Arrange for needed discharge resources and transportation as appropriate  - Identify discharge learning needs (meds, wound care, etc )  - Arrange for interpretive services to assist at discharge as needed  - Refer to Case Management Department for coordinating discharge planning if the patient needs post-hospital services based on physician/advanced practitioner order or complex needs related to functional status, cognitive ability, or social support system  Outcome: Progressing     Problem: Knowledge Deficit  Goal: Patient/family/caregiver demonstrates understanding of disease process, treatment plan, medications, and discharge instructions  Description: Complete learning assessment and assess knowledge base    Interventions:  - Provide teaching at level of understanding  - Provide teaching via preferred learning methods  Outcome: Progressing     Problem: GASTROINTESTINAL - ADULT  Goal: Minimal or absence of nausea and/or vomiting  Description: INTERVENTIONS:  - Administer IV fluids if ordered to ensure adequate hydration  - Maintain NPO status until nausea and vomiting are resolved  - Nasogastric tube if ordered  - Administer ordered antiemetic medications as needed  - Provide nonpharmacologic comfort measures as appropriate  - Advance diet as tolerated, if ordered  - Consider nutrition services referral to assist patient with adequate nutrition and appropriate food choices  Outcome: Progressing  Goal: Maintains or returns to baseline bowel function  Description: INTERVENTIONS:  - Assess bowel function  - Encourage oral fluids to ensure adequate hydration  - Administer IV fluids if ordered to ensure adequate hydration  - Administer ordered medications as needed  - Encourage mobilization and activity  - Consider nutritional services referral to assist patient with adequate nutrition and appropriate food choices  Outcome: Progressing  Goal: Maintains adequate nutritional intake  Description: INTERVENTIONS:  - Monitor percentage of each meal consumed  - Identify factors contributing to decreased intake, treat as appropriate  - Assist with meals as needed  - Monitor I&O, weight, and lab values if indicated  - Obtain nutrition services referral as needed  Outcome: Progressing  Goal: Oral mucous membranes remain intact  Description: INTERVENTIONS  - Assess oral mucosa and hygiene practices  - Implement preventative oral hygiene regimen  - Implement oral medicated treatments as ordered  - Initiate Nutrition services referral as needed  Outcome: Progressing

## 2022-05-26 NOTE — ANESTHESIA PREPROCEDURE EVALUATION
Procedure:  APPENDECTOMY LAPAROSCOPIC (N/A Abdomen)    Relevant Problems   ANESTHESIA   (+) Motion sickness      CARDIO   (+) Hyperlipemia      MUSCULOSKELETAL   (+) Lower back pain      Digestive   (+) Appendicitis        Physical Exam    Airway    Mallampati score: II  TM Distance: >3 FB  Neck ROM: full     Dental   upper dentures,     Cardiovascular      Pulmonary      Other Findings        Anesthesia Plan  ASA Score- 2     Anesthesia Type- general with ASA Monitors  Additional Monitors:   Airway Plan:           Plan Factors-Exercise tolerance (METS): >4 METS  Chart reviewed  Existing labs reviewed  Patient summary reviewed  Patient is not a current smoker  Induction- rapid sequence induction  Postoperative Plan- Plan for postoperative opioid use  Planned trial extubation    Informed Consent- Anesthetic plan and risks discussed with patient  I personally reviewed this patient with the CRNA  Discussed and agreed on the Anesthesia Plan with the CRNA  Favio Oconnell

## 2022-05-26 NOTE — OP NOTE
OPERATIVE REPORT  PATIENT NAME: Buddy Pan    :  1960  MRN: [id number]  Pt Location: [facility] OR ROOM 01    SURGERY DATE: 2022    Surgeon(s) and Role:     * Ludin Proctor DO - Primary     * [place] Finders Billig, PA-C - Assisting    Preop Diagnosis:  Acute appendicitis    Post-Op Diagnosis Codes:   same    Procedure(s) (LRB):  APPENDECTOMY LAPAROSCOPIC (N/A)    Specimen(s):  ID Type Source Tests Collected by Time Destination   1 :  Tissue Appendix TISSUE EXAM Ludin Proctor DO 2022  2:54 PM        Estimated Blood Loss:   Minimal    Drains:  [REMOVED] NG/OG/Enteral Tube Orogastric 18 Fr Center mouth (Removed)   Number of days: 0       Anesthesia Type:   General    Operative Indications:  Acute appendicitis    Operative Findings:  Dilated appendix, hyperemic and inflamed in appearance, no sign of perforation, no peritonitis    Complications:   None    Procedure and Technique:  The patient is brought to the operating room  A time-out was held the patient identified and procedure verified  Appropriate antibiotic prophylaxis and DVT prophylaxis was used  General anesthesia was administered  The area of the abdomen was prepped and draped in usual sterile fashion using chlorhexidine solution  Local anesthesia using 0 25% Marcaine with epinephrine was infiltrated in the supraumbilical area  The skin was grasped and elevated using towel clamps  An incision was made using 11 blade scalpel  A Veress needle was placed and confirmed to be intraperitoneal using a saline drop test   The abdomen was insufflated to 15 mmHg intra-abdominal pressure  A 5 mm trocar was placed  The abdomen was inspected there were no adhesions noted  An additional 5 mm trocar was placed in the left lateral abdomen under direct visualization and after the infiltration of local anesthesia  An additional 5 mm trocar was placed in the suprapubic area in the same manner    The patient was rotated towards her left side and placed in Trendelenburg position  The omentum was retracted out of the right lower quadrant  This revealed an acutely inflamed appendix  There is no sign of perforation  The appendix was grasped and elevated anteriorly  The mesoappendix was divided using the Harmonic scissor  The base of the appendix was freed of all surrounding tissue  Three Endoloops were placed at the base of the appendix  The appendix was then divided leaving 2 Endoloops on the proximal end  The appendix was placed within a 5 mm Endo-Catch bag and removed through the left lower quadrant port site  The fascia of the extraction trocar site was closed using a suture of 0 Vicryl with the laparoscopic suture Passer  All trocars were removed and the abdomen was desufflated  Skin was closed using 4 0 Vicryl in a subcuticular layer  The patient tolerated the procedure well transferred to recovery in stable condition  At the end the procedure all needle instrument sponge counts were correct x2     I was present for the entire procedure and A physician assistant was required during the procedure for retraction tissue handling,dissection and suturing    Patient Disposition:  PACU       SIGNATURE: Eryn Mullen DO  DATE: May 26, 2022  TIME: 2:54 PM

## 2022-05-26 NOTE — ANESTHESIA POSTPROCEDURE EVALUATION
Post-Op Assessment Note    CV Status:  Stable  Pain Score: 0    Pain management: adequate  Multimodal analgesia used between 6 hours prior to anesthesia start to PACU discharge    Mental Status:  Sleepy   Hydration Status:  Euvolemic   PONV Controlled:  Controlled   Airway Patency:  Patent   Two or more mitigation strategies used for obstructive sleep apnea   Post Op Vitals Reviewed: Yes      Staff: CRNA         No complications documented      BP   107/56   Temp 97 9 °F (36 6 °C) (05/26/22 1502)    Pulse  66   Resp   19   SpO2   99

## 2022-05-26 NOTE — NURSING NOTE
Patient tolerated jello and italian ice only so far  She does not want there diet to be advanced yet

## 2022-05-26 NOTE — H&P
H&P - General Surgery   Shauna Colvin 63 y o  female MRN: [id number]  Unit/Bed#: ED 10 Encounter: [id number]    Assessment:  63 y o  female with abdominal pain, abnormal appendix, periappendiceal fat stranding    CTAP pertinent IMPRESSION:  - Chronic appendiceal mucocele  However, new trace periappendiceal fat stranding suggestive of superimposed acute uncomplicated appendicitis in the appropriate clinical context  - Afebrile, VSS on room air  - Leukocytosis 13 52  - Hgb 13 9  - Lipase 162  - Troponin 0 hour negative  - UA with trace blood   - Prior hx cholecystectomy    Plan:  - Based on patient presentation and location of pain, patient provided with option of continuing to monitor as observation status overnight versus going to OR today - decision made for OR today second to abnormalities on imaging and elevation of WBC  - Plan for OR today for laparoscopic appendectomy   - NPO effective now  - IVF  - IV abx - Ancef/Flagyl on call to OR  - Pain/nausea control PRN  - OOB  ambulating ad natasha  - SCDs     ____________________________________________________________________  Chief Complaint: abdominal pain     HPI: Shauna Colvin is a 63 y o  female with prior history of cholecystectomy who presented to [facility] ED this AM with abdominal pain  Patient explains starting last night she began with periumbilical pain that now seemed to travel a bit superiorly  She denies prior pain like this  She does state she was previously made aware her appendix was enlarged but nothing needed to be done at that time  She explains she was able to tolerate food last night without any pain and has had hot tea and toast this morning without nausea or vomiting  Patient denies feeling warm and did check her temperature at home which was not significant for a fever  She has had a colonoscopy in the past  Denies any significant PMH or other PSH       Review of Systems:  General: negative for - chills, fatigue or fever  Cardiovascular: no chest pain or dyspnea on exertion  Respiratory: no cough, shortness of breath, or wheezing  Gastrointestinal: positive for - abdominal pain most significantly around umbilicus and superior  negative for - constipation, gas/bloating or nausea/vomiting  Genitourinary: no dysuria, trouble voiding, or hematuria  Musculoskeletal: negative for - joint pain or muscle pain  Neurological: negative for - dizziness, headaches or numbness/tingling  Hematological and Lymphatic: negative for - bleeding problems or blood clots  Dermatological: negative  Psychological: negative    Past Medical History:  Past Medical History:   Diagnosis Date    Anxiety     Hyperlipemia     Psychiatric disorder     Sciatic leg pain        Past Surgical History:  Past Surgical History:   Procedure Laterality Date    CHOLECYSTECTOMY       Social History:  Social History     Substance and Sexual Activity   Alcohol Use Never    Alcohol/week: 0 0 standard drinks     Social History     Substance and Sexual Activity   Drug Use Never     Social History     Tobacco Use   Smoking Status Former Smoker   Smokeless Tobacco Never Used     Family History:  History reviewed  No pertinent family history  Allergies:  No Known Allergies    Medications:  Home meds:   Prior to Admission medications    Medication Sig Start Date End Date Taking?  Authorizing Provider   fluticasone (FLONASE) 50 mcg/act nasal spray 2 sprays into each nostril daily 2/21/22  Yes Historical Provider, MD   rosuvastatin (CRESTOR) 10 MG tablet Take 10 mg by mouth daily 6/9/20  Yes Historical Provider, MD   Omega-3 Fatty Acids (fish oil) 1,000 mg daily    Historical Provider, MD   acetaminophen (TYLENOL) 325 mg tablet Take 2 tablets (650 mg total) by mouth every 6 (six) hours as needed for mild pain, headaches or fever 8/14/20 5/26/22  Chrissy Rivera MD   gabapentin (NEURONTIN) 100 mg capsule Take 1 capsule (100 mg total) by mouth 3 (three) times a day 9/11/20 5/26/22  Luis Jerez MD ketorolac (TORADOL) 10 mg tablet Take 1 tablet (10 mg total) by mouth every 6 (six) hours as needed for moderate pain 8/14/20 5/26/22  Saida Perez MD   methocarbamol (ROBAXIN) 500 mg tablet Take 1 tablet (500 mg total) by mouth 4 (four) times a day 9/11/20 5/26/22  Rowan Olmos MD     Vitals:  /63   Pulse 69   Temp (!) 97 °F (36 1 °C) (Temporal)   Resp 18   Wt 74 1 kg (163 lb 5 8 oz)   SpO2 99%   BMI 28 94 kg/m²   Body mass index is 28 94 kg/m²  Weight (last 2 days)     Date/Time Weight    05/26/22 0927 74 1 (163 36)        I/Os:  No intake or output data in the 24 hours ending 05/26/22 1246    Physical Exam  Constitutional:       General: She is not in acute distress  Appearance: Normal appearance  HENT:      Head: Normocephalic and atraumatic  Right Ear: External ear normal       Left Ear: External ear normal       Nose: Nose normal       Mouth/Throat:      Mouth: Mucous membranes are moist       Pharynx: Oropharynx is clear  Eyes:      Extraocular Movements: Extraocular movements intact  Pupils: Pupils are equal, round, and reactive to light  Cardiovascular:      Rate and Rhythm: Normal rate  Pulses: Normal pulses  Pulmonary:      Effort: Pulmonary effort is normal       Breath sounds: No wheezing  Abdominal:      General: Abdomen is flat  Bowel sounds are normal  There is no distension  Palpations: Abdomen is soft  Tenderness: There is abdominal tenderness (most significantly periumbilical and epigastric regions, minimal tenderness to palpation of the right side  )  There is no guarding or rebound  Musculoskeletal:         General: No swelling  Normal range of motion  Cervical back: Normal range of motion  Skin:     General: Skin is warm and dry  Capillary Refill: Capillary refill takes less than 2 seconds  Neurological:      General: No focal deficit present  Mental Status: She is alert and oriented to person, place, and time  Psychiatric:         Mood and Affect: Mood normal          Behavior: Behavior normal         Lab Results and Cultures:   COVID:   Last COVID19 Screening Values     None         CBC:   Results from last 7 days   Lab Units 05/26/22  0959   WBC Thousand/uL 13 52*   HEMOGLOBIN g/dL 13 9   HEMATOCRIT % 41 9   PLATELETS Thousands/uL 249     BMP/CMP:  Results from last 7 days   Lab Units 05/26/22  0959   POTASSIUM mmol/L 4 0   CHLORIDE mmol/L 106   CO2 mmol/L 26   BUN mg/dL 14   CREATININE mg/dL 0 89   CALCIUM mg/dL 8 9     Coags:     Lipid panel:     HgbA1c: No results found for: HGBA1C    Urinalysis:   Lab Results   Component Value Date    COLORU Light Yellow 05/26/2022    CLARITYU Clear 05/26/2022    SPECGRAV 1 015 05/26/2022    PHUR 6 0 05/26/2022    LEUKOCYTESUR Negative 05/26/2022    NITRITE Negative 05/26/2022    GLUCOSEU Negative 05/26/2022    KETONESU Negative 05/26/2022    BILIRUBINUR Negative 05/26/2022    BLOODU Trace-lysed (A) 05/26/2022   ,   Urine Culture: No results found for: URINECX  Wound Culure: No results found for: WOUNDCULT  Blood Culture:   Lab Results   Component Value Date    BLOODCX No Growth After 5 Days  08/14/2020    BLOODCX No Growth After 5 Days  08/14/2020     Imaging Studies: Pertinent impression as above  EKG, Pathology, and Other Studies: I have personally reviewed pertinent reports  VTE Prophylaxis: Reason for no pharmacologic prophylaxis healthy individual, low risk      Code Status: Level 1 - Full Code  Advance Directive and Living Will:      Power of :    POLST:      Cr Hull PA-C  5/26/2022

## 2022-05-26 NOTE — ANESTHESIA PREPROCEDURE EVALUATION
Procedure:  APPENDECTOMY LAPAROSCOPIC (N/A Abdomen)    Relevant Problems   ANESTHESIA   (+) Motion sickness      CARDIO   (+) Hyperlipemia      MUSCULOSKELETAL   (+) Lower back pain      Digestive   (+) Appendicitis        Physical Exam    Airway    Mallampati score: II  TM Distance: >3 FB  Neck ROM: full     Dental   upper dentures,     Cardiovascular      Pulmonary      Other Findings        Anesthesia Plan  ASA Score- 2     Anesthesia Type- general with ASA Monitors  Additional Monitors:   Airway Plan: ETT  Plan Factors-Exercise tolerance (METS): >4 METS  Chart reviewed  Existing labs reviewed  Patient summary reviewed  Patient is not a current smoker  Induction- intravenous  Postoperative Plan- Plan for postoperative opioid use  Planned trial extubation    Informed Consent- Anesthetic plan and risks discussed with patient  I personally reviewed this patient with the CRNA  Discussed and agreed on the Anesthesia Plan with the CRNA  Mikie Wakefield

## 2022-05-26 NOTE — ED PROVIDER NOTES
History  Chief Complaint   Patient presents with    Abdominal Pain     Starting about 12hrs ago with epigastric pain, pain is worse with lying flat, denies N/V/D       History provided by:  Medical records, patient and spouse  Abdominal Pain  Pain location:  Epigastric  Pain quality: dull and gnawing    Pain radiates to:  Does not radiate  Pain severity:  Moderate  Onset quality:  Gradual  Duration:  15 hours  Timing:  Constant  Progression:  Improving  Chronicity:  New  Context comment:  Patient developed epigastric discomfort last night, relates that to be pressure, gnawing, nonradiating without associated symptoms  Pain is nearly resolved  Relieved by:  Nothing  Worsened by:  Nothing  Ineffective treatments:  None tried  Associated symptoms: no chest pain, no chills, no cough, no diarrhea, no dysuria, no fatigue, no fever, no hematuria, no nausea, no shortness of breath, no sore throat and no vomiting        Prior to Admission Medications   Prescriptions Last Dose Informant Patient Reported? Taking? Omega-3 Fatty Acids (fish oil) 1,000 mg Past Week at Unknown time  Yes Yes   Sig: daily   fluticasone (FLONASE) 50 mcg/act nasal spray 2022 at Unknown time  Yes Yes   Si sprays into each nostril if needed   rosuvastatin (CRESTOR) 10 MG tablet 2022 at Unknown time  Yes Yes   Sig: Take 10 mg by mouth daily at bedtime      Facility-Administered Medications: None       Past Medical History:   Diagnosis Date    Anxiety     Hyperlipemia     Psychiatric disorder     Sciatic leg pain        Past Surgical History:   Procedure Laterality Date    APPENDECTOMY LAPAROSCOPIC N/A 2022    Procedure: APPENDECTOMY LAPAROSCOPIC;  Surgeon: Anastasia Damon DO;  Location:  MAIN OR;  Service: General    CHOLECYSTECTOMY         History reviewed  No pertinent family history  I have reviewed and agree with the history as documented      E-Cigarette/Vaping    E-Cigarette Use Never User E-Cigarette/Vaping Substances    Nicotine No     THC No     CBD No     Flavoring No     Other No     Unknown No      Social History     Tobacco Use    Smoking status: Former Smoker    Smokeless tobacco: Never Used   Vaping Use    Vaping Use: Never used   Substance Use Topics    Alcohol use: Never     Alcohol/week: 0 0 standard drinks    Drug use: Never       Review of Systems   Constitutional: Negative for chills, fatigue and fever  HENT: Negative for ear discharge, ear pain, rhinorrhea and sore throat  Eyes: Negative for pain and visual disturbance  Respiratory: Negative for cough and shortness of breath  Cardiovascular: Negative for chest pain and palpitations  Gastrointestinal: Positive for abdominal pain  Negative for diarrhea, nausea and vomiting  Endocrine: Negative for polyuria  Genitourinary: Negative for difficulty urinating, dysuria, flank pain and hematuria  Musculoskeletal: Negative for arthralgias and back pain  Skin: Negative for color change and rash  Neurological: Negative for dizziness, seizures, syncope, weakness and headaches  Psychiatric/Behavioral: Negative for confusion and self-injury  The patient is not nervous/anxious  All other systems reviewed and are negative  Physical Exam  Physical Exam  Constitutional:       General: She is not in acute distress  Appearance: Normal appearance  She is not ill-appearing, toxic-appearing or diaphoretic  HENT:      Head: Normocephalic and atraumatic  Nose: Nose normal  No congestion or rhinorrhea  Mouth/Throat:      Mouth: Mucous membranes are moist       Pharynx: Oropharynx is clear  No oropharyngeal exudate or posterior oropharyngeal erythema  Eyes:      General:         Right eye: No discharge  Left eye: No discharge  Cardiovascular:      Rate and Rhythm: Normal rate and regular rhythm  Pulses: Normal pulses  Heart sounds: Normal heart sounds  No murmur heard      No gallop  Pulmonary:      Effort: Pulmonary effort is normal  No respiratory distress  Breath sounds: Normal breath sounds  No stridor  No wheezing, rhonchi or rales  Chest:      Chest wall: No tenderness  Abdominal:      General: Bowel sounds are normal  There is no distension  Palpations: Abdomen is soft  There is no mass  Tenderness: There is abdominal tenderness in the epigastric area  There is no right CVA tenderness, left CVA tenderness, guarding or rebound  Hernia: No hernia is present  Comments: Mild epigastric tenderness to palpation, soft abdomen   Musculoskeletal:         General: Normal range of motion  Cervical back: Normal range of motion and neck supple  Skin:     General: Skin is warm and dry  Capillary Refill: Capillary refill takes less than 2 seconds  Neurological:      General: No focal deficit present  Mental Status: She is alert and oriented to person, place, and time  Cranial Nerves: No cranial nerve deficit  Sensory: No sensory deficit  Motor: No weakness  Coordination: Coordination normal       Gait: Gait normal       Deep Tendon Reflexes: Reflexes normal    Psychiatric:         Mood and Affect: Mood normal          Behavior: Behavior normal          Thought Content:  Thought content normal          Judgment: Judgment normal          Vital Signs  ED Triage Vitals [05/26/22 0927]   Temperature Pulse Respirations Blood Pressure SpO2   (!) 97 °F (36 1 °C) 69 18 130/62 99 %      Temp Source Heart Rate Source Patient Position - Orthostatic VS BP Location FiO2 (%)   Temporal Monitor Lying Left arm --      Pain Score       3           Vitals:    05/26/22 1801 05/26/22 1817 05/26/22 2221 05/27/22 0727   BP: 95/56 94/52 96/55 98/55   Pulse: 58  55 65   Patient Position - Orthostatic VS:  Lying           Visual Acuity      ED Medications  Medications   famotidine (PEPCID) tablet 20 mg (20 mg Oral Given 5/26/22 1000)   fentanyl citrate (PF) 100 MCG/2ML 50 mcg (50 mcg Intravenous Given 5/26/22 1131)   ceFAZolin (ANCEF) IVPB (premix in dextrose) 2,000 mg 50 mL ( Intravenous MAR Unhold 5/26/22 1415)       Diagnostic Studies  Results Reviewed     Procedure Component Value Units Date/Time    HS Troponin 0hr (reflex protocol) [[id number]]  (Normal) Collected: 05/26/22 0959    Lab Status: Final result Specimen: Blood from Line, Venous Updated: 05/26/22 1027     hs TnI 0hr 3 ng/L     Urine Microscopic [[id number]] Collected: 05/26/22 0959    Lab Status: Final result Specimen: Urine, Clean Catch Updated: 05/26/22 1025     RBC, UA 0-1 /hpf      WBC, UA 1-2 /hpf      Epithelial Cells Occasional /hpf      Bacteria, UA Occasional /hpf      OTHER OBSERVATIONS Transitional Epithelial Cells    Lipase [[id number]]  (Normal) Collected: 05/26/22 0959    Lab Status: Final result Specimen: Blood from Line, Venous Updated: 05/26/22 1021     Lipase 162 u/L     Comprehensive metabolic panel [[id number]] Collected: 05/26/22 0959    Lab Status: Final result Specimen: Blood from Line, Venous Updated: 05/26/22 1021     Sodium 140 mmol/L      Potassium 4 0 mmol/L      Chloride 106 mmol/L      CO2 26 mmol/L      ANION GAP 8 mmol/L      BUN 14 mg/dL      Creatinine 0 89 mg/dL      Glucose 128 mg/dL      Calcium 8 9 mg/dL      AST 16 U/L      ALT 24 U/L      Alkaline Phosphatase 76 U/L      Total Protein 6 8 g/dL      Albumin 3 9 g/dL      Total Bilirubin 0 52 mg/dL      eGFR 71 ml/min/1 73sq m     Narrative:      [place] guidelines for Chronic Kidney Disease (CKD):     Stage 1 with normal or high GFR (GFR > 90 mL/min/1 73 square meters)    Stage 2 Mild CKD (GFR = 60-89 mL/min/1 73 square meters)    Stage 3A Moderate CKD (GFR = 45-59 mL/min/1 73 square meters)    Stage 3B Moderate CKD (GFR = 30-44 mL/min/1 73 square meters)    Stage 4 Severe CKD (GFR = 15-29 mL/min/1 73 square meters)    Stage 5 End Stage CKD (GFR <15 mL/min/1 73 square meters)  Note: GFR calculation is accurate only with a steady state creatinine    UA (URINE) with reflex to Scope [[id number]]  (Abnormal) Collected: 05/26/22 0959    Lab Status: Final result Specimen: Urine, Clean Catch Updated: 05/26/22 1009     Color, UA Light Yellow     Clarity, UA Clear     Specific Gravity, UA 1 015     pH, UA 6 0     Leukocytes, UA Negative     Nitrite, UA Negative     Protein, UA Negative mg/dl      Glucose, UA Negative mg/dl      Ketones, UA Negative mg/dl      Urobilinogen, UA 0 2 E U /dl      Bilirubin, UA Negative     Blood, UA Trace-lysed    CBC and differential [[id number]]  (Abnormal) Collected: 05/26/22 0959    Lab Status: Final result Specimen: Blood from Line, Venous Updated: 05/26/22 1006     WBC 13 52 Thousand/uL      RBC 4 45 Million/uL      Hemoglobin 13 9 g/dL      Hematocrit 41 9 %      MCV 94 fL      MCH 31 2 pg      MCHC 33 2 g/dL      RDW 13 0 %      MPV 10 5 fL      Platelets 763 Thousands/uL      nRBC 0 /100 WBCs      Neutrophils Relative 86 %      Immat GRANS % 0 %      Lymphocytes Relative 8 %      Monocytes Relative 6 %      Eosinophils Relative 0 %      Basophils Relative 0 %      Neutrophils Absolute 11 56 Thousands/µL      Immature Grans Absolute 0 03 Thousand/uL      Lymphocytes Absolute 1 10 Thousands/µL      Monocytes Absolute 0 75 Thousand/µL      Eosinophils Absolute 0 04 Thousand/µL      Basophils Absolute 0 04 Thousands/µL                  CT abdomen pelvis wo contrast   Final Result by Michell Ramos MD (05/26 1148)      Chronic appendiceal mucocele  However, new trace periappendiceal fat stranding suggestive of superimposed acute uncomplicated appendicitis in the appropriate clinical context  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 better visualized on the prior contrast enhanced CT  This can be seen in the setting of median arcuate ligament syndrome or as a normal variant in asymptomatic patients        The study was marked in EPIC for immediate notification  Workstation performed: [id number]                    Procedures  Procedures         ED Course  ED Course as of 05/30/22 1353   u May 26, 2022   0940 0940:  Patient appears well, vital signs stable  Patient with epigastric abdominal pain started last night, improving  Patient denies chest pain or radiation of pain  Placed on the monitor  Plan to complete EKG, basic labs including lipase  Patient is status post cholecystectomy  Reviewed CT scan from 2020  Given benign state of her abdomen will hold off on imaging  1200 1200: CT and labs reviewed  I will consult gen surgery for evaluation  SBIRT 22yo+    Flowsheet Row Most Recent Value   SBIRT (25 yo +)    In order to provide better care to our patients, we are screening all of our patients for alcohol and drug use  Would it be okay to ask you these screening questions? Yes Filed at: 05/26/2022 6353   Initial Alcohol Screen: US AUDIT-C     1  How often do you have a drink containing alcohol? 0 Filed at: 05/26/2022 0929   2  How many drinks containing alcohol do you have on a typical day you are drinking? 0 Filed at: 05/26/2022 0929   3a  Male UNDER 65: How often do you have five or more drinks on one occasion? 0 Filed at: 05/26/2022 0929   3b  FEMALE Any Age, or MALE 65+: How often do you have 4 or more drinks on one occassion? 0 Filed at: 05/26/2022 0929   Audit-C Score 0 Filed at: 05/26/2022 2845   MONIQUE: How many times in the past year have you    Used an illegal drug or used a prescription medication for non-medical reasons?  Never Filed at: 05/26/2022 8974                    MDM    Disposition  Final diagnoses:   Epigastric pain   Appendicitis     Time reflects when diagnosis was documented in both MDM as applicable and the Disposition within this note     Time User Action Codes Description Comment    5/26/2022  9:45 AM Preston Pablo Add [R10 13] Epigastric pain     5/26/2022 12:05 PM Stana Labor Appendicitis     5/26/2022 12:05 PM Leida Saint [R10 13] Epigastric pain     5/26/2022 12:05 PM Nic Nancy Appendicitis     5/26/2022 12:52 PM Ricarda Pain, [place] Add [Q35 79] Periumbilical abdominal pain     5/26/2022  2:55 PM Clifm Friendly Modify [H71 15] Periumbilical abdominal pain       ED Disposition     ED Disposition   Admit    Condition   Stable    Date/Time   Thu May 26, 2022 12:05 PM    Comment   --         Follow-up Information     Follow up With Specialties Details Why Contact Info    Christine Oliveros PA-C General Surgery Schedule an appointment as soon as possible for a visit in 2 week(s)  Landon Gatica [address] [place] [address]  [phone]            Discharge Medication List as of 5/27/2022 10:26 AM      START taking these medications    Details   ibuprofen (MOTRIN) 600 mg tablet Take 1 tablet (600 mg total) by mouth every 6 (six) hours as needed for moderate pain, Starting Fri 5/27/2022, Normal         CONTINUE these medications which have NOT CHANGED    Details   fluticasone (FLONASE) 50 mcg/act nasal spray 2 sprays into each nostril if needed, Starting Mon 2/21/2022, Historical Med      Omega-3 Fatty Acids (fish oil) 1,000 mg daily, Historical Med      rosuvastatin (CRESTOR) 10 MG tablet Take 10 mg by mouth daily at bedtime, Starting Tue 6/9/2020, Historical Med             Outpatient Discharge Orders   Lifting restrictions     No strenuous exercise     Call provider for:  extreme fatigue     Call provider for:  persistent dizziness or light-headedness     Call provider for:  hives     Call provider for:  difficulty breathing, headache or visual disturbances     Call provider for: active or persistent bleeding     Call provider for:  severe uncontrolled pain     Call provider for:  redness, tenderness, or signs of infection (pain, swelling, redness, odor or green/yellow discharge around incision site)     Call provider for:  persistent nausea or vomiting     Follow-up with surgeon in 1-2 weeks       PDMP Review     None          ED Provider  Electronically Signed by           Niharika Ludwig MD  05/30/22 8675

## 2022-05-26 NOTE — NURSING NOTE
Dr Kalin Albarran aware of latest vital signs  Patient remains asymptomatic with lower BP  Will continue to monitor

## 2022-05-27 VITALS
DIASTOLIC BLOOD PRESSURE: 55 MMHG | TEMPERATURE: 98.6 F | HEART RATE: 65 BPM | SYSTOLIC BLOOD PRESSURE: 98 MMHG | HEIGHT: 63 IN | OXYGEN SATURATION: 95 % | RESPIRATION RATE: 18 BRPM | BODY MASS INDEX: 28.88 KG/M2 | WEIGHT: 163 LBS

## 2022-05-27 PROBLEM — K37 APPENDICITIS: Status: RESOLVED | Noted: 2020-08-14 | Resolved: 2022-05-27

## 2022-05-27 LAB
ATRIAL RATE: 66 BPM
P AXIS: 47 DEGREES
PR INTERVAL: 176 MS
QRS AXIS: -25 DEGREES
QRSD INTERVAL: 84 MS
QT INTERVAL: 376 MS
QTC INTERVAL: 394 MS
T WAVE AXIS: 43 DEGREES
VENTRICULAR RATE: 66 BPM

## 2022-05-27 RX ORDER — IBUPROFEN 600 MG/1
600 TABLET ORAL EVERY 6 HOURS PRN
Qty: 30 TABLET | Refills: 0 | Status: SHIPPED | OUTPATIENT
Start: 2022-05-27

## 2022-05-27 RX ADMIN — ACETAMINOPHEN 650 MG: 325 TABLET ORAL at 05:59

## 2022-05-27 RX ADMIN — SODIUM CHLORIDE 125 ML/HR: 0.9 INJECTION, SOLUTION INTRAVENOUS at 05:47

## 2022-05-27 NOTE — NURSING NOTE
Discharge instructions reviewed with patient  Patient verbalized understanding of same  Agrees to follow up with general surgery outpatient

## 2022-05-27 NOTE — PLAN OF CARE
Problem: Potential for Falls  Goal: Patient will remain free of falls  Description: INTERVENTIONS:  - Educate patient/family on patient safety including physical limitations  - Instruct patient to call for assistance with activity   - Consult OT/PT to assist with strengthening/mobility   - Keep Call bell within reach  - Keep bed low and locked with side rails adjusted as appropriate  - Keep care items and personal belongings within reach  - Initiate and maintain comfort rounds  - Make Fall Risk Sign visible to staff  - Apply yellow socks and bracelet for high fall risk patients  - Consider moving patient to room near nurses station  Outcome: Progressing     Problem: PAIN - ADULT  Goal: Verbalizes/displays adequate comfort level or baseline comfort level  Description: Interventions:  - Encourage patient to monitor pain and request assistance  - Assess pain using appropriate pain scale  - Administer analgesics based on type and severity of pain and evaluate response  - Implement non-pharmacological measures as appropriate and evaluate response  - Consider cultural and social influences on pain and pain management  - Notify physician/advanced practitioner if interventions unsuccessful or patient reports new pain  Outcome: Progressing     Problem: INFECTION - ADULT  Goal: Absence or prevention of progression during hospitalization  Description: INTERVENTIONS:  - Assess and monitor for signs and symptoms of infection  - Monitor lab/diagnostic results  - Monitor all insertion sites, i e  indwelling lines, tubes, and drains  - Monitor endotracheal if appropriate and nasal secretions for changes in amount and color  - [place] appropriate cooling/warming therapies per order  - Administer medications as ordered  - Instruct and encourage patient and family to use good hand hygiene technique  - Identify and instruct in appropriate isolation precautions for identified infection/condition  Outcome: Progressing     Problem: SAFETY ADULT  Goal: Patient will remain free of falls  Description: INTERVENTIONS:  - Educate patient/family on patient safety including physical limitations  - Instruct patient to call for assistance with activity   - Consult OT/PT to assist with strengthening/mobility   - Keep Call bell within reach  - Keep bed low and locked with side rails adjusted as appropriate  - Keep care items and personal belongings within reach  - Initiate and maintain comfort rounds  - Make Fall Risk Sign visible to staff  - Apply yellow socks and bracelet for high fall risk patients  - Consider moving patient to room near nurses station  Outcome: Progressing  Goal: Maintain or return to baseline ADL function  Description: INTERVENTIONS:  -  Assess patient's ability to carry out ADLs; assess patient's baseline for ADL function and identify physical deficits which impact ability to perform ADLs (bathing, care of mouth/teeth, toileting, grooming, dressing, etc )  - Assess/evaluate cause of self-care deficits   - Assess range of motion  - Assess patient's mobility; develop plan if impaired  - Assess patient's need for assistive devices and provide as appropriate  - Encourage maximum independence but intervene and supervise when necessary  - Involve family in performance of ADLs  - Assess for home care needs following discharge   - Consider OT consult to assist with ADL evaluation and planning for discharge  - Provide patient education as appropriate  Outcome: Progressing  Goal: Maintains/Returns to pre admission functional level  Description: INTERVENTIONS:  - Perform BMAT or MOVE assessment daily    - Set and communicate daily mobility goal to care team and patient/family/caregiver     - Collaborate with rehabilitation services on mobility goals if consulted  - Out of bed for toileting  - Record patient progress and toleration of activity level   Outcome: Progressing     Problem: DISCHARGE PLANNING  Goal: Discharge to home or other facility with appropriate resources  Description: INTERVENTIONS:  - Identify barriers to discharge w/patient and caregiver  - Arrange for needed discharge resources and transportation as appropriate  - Identify discharge learning needs (meds, wound care, etc )  - Arrange for interpretive services to assist at discharge as needed  - Refer to Case Management Department for coordinating discharge planning if the patient needs post-hospital services based on physician/advanced practitioner order or complex needs related to functional status, cognitive ability, or social support system  Outcome: Progressing     Problem: Knowledge Deficit  Goal: Patient/family/caregiver demonstrates understanding of disease process, treatment plan, medications, and discharge instructions  Description: Complete learning assessment and assess knowledge base    Interventions:  - Provide teaching at level of understanding  - Provide teaching via preferred learning methods  Outcome: Progressing     Problem: GASTROINTESTINAL - ADULT  Goal: Minimal or absence of nausea and/or vomiting  Description: INTERVENTIONS:  - Administer IV fluids if ordered to ensure adequate hydration  - Maintain NPO status until nausea and vomiting are resolved  - Nasogastric tube if ordered  - Administer ordered antiemetic medications as needed  - Provide nonpharmacologic comfort measures as appropriate  - Advance diet as tolerated, if ordered  - Consider nutrition services referral to assist patient with adequate nutrition and appropriate food choices  Outcome: Progressing  Goal: Maintains or returns to baseline bowel function  Description: INTERVENTIONS:  - Assess bowel function  - Encourage oral fluids to ensure adequate hydration  - Administer IV fluids if ordered to ensure adequate hydration  - Administer ordered medications as needed  - Encourage mobilization and activity  - Consider nutritional services referral to assist patient with adequate nutrition and appropriate food choices  Outcome: Progressing  Goal: Maintains adequate nutritional intake  Description: INTERVENTIONS:  - Monitor percentage of each meal consumed  - Identify factors contributing to decreased intake, treat as appropriate  - Assist with meals as needed  - Monitor I&O, weight, and lab values if indicated  - Obtain nutrition services referral as needed  Outcome: Progressing  Goal: Oral mucous membranes remain intact  Description: INTERVENTIONS  - Assess oral mucosa and hygiene practices  - Implement preventative oral hygiene regimen  - Implement oral medicated treatments as ordered  - Initiate Nutrition services referral as needed  Outcome: Progressing

## 2022-05-27 NOTE — PROGRESS NOTES
Progress Note - General Surgery   Geralyn Gowers 63 y o  female MRN: [id number]  Unit/Bed#: -01 Encounter: [id number]    Assessment:  - POD#1 s/p laparoscopic appendectomy for acute appendicitis  - Tolerated clears, awaiting regular diet  - Previous the pain resolved  Patient states she is sore near her LLQ incision   - No N/V, fever, or chills  - Mildly hypotensive last night into this AM, BP 98/55  Pt asymptomatic  Afebrile    Plan:  - Regular diet this AM  - Will discharge today if tolerates regular diet  - Pain control prn  - Follow up with Kaitlin Nelson in 2 weeks  - May not lift over 10 lb or perform strenuous activities for 2 weeks  - Pt has FMLA paperwork she will need to be completed, will drop off at [place] office after discharge  Subjective/Objective   Subjective:  Patient states she feels much better today  The previously noted abdominal pain she had preoperatively is now resolved  She does report soreness near her LLQ incision  No BM or flatus yet  Feels mildly bloated  Tolerated clears well last night  Appetite is decreased  Denies fever, chills, fatigue, weakness, lightheadedness, chest pain, or shortness of breath  Objective:     Blood pressure 98/55, pulse 65, temperature 98 6 °F (37 °C), resp  rate 18, height 5' 3" (1 6 m), weight 73 9 kg (163 lb), SpO2 95 %  ,Body mass index is 28 87 kg/m²        Intake/Output Summary (Last 24 hours) at 5/27/2022 0758  Last data filed at 5/26/2022 2001  Gross per 24 hour   Intake 1657 92 ml   Output 300 ml   Net 1357 92 ml       Invasive Devices  Report    Peripheral Intravenous Line  Duration           Peripheral IV 05/26/22 Right Antecubital <1 day                Physical Exam: BP 98/55   Pulse 65   Temp 98 6 °F (37 °C)   Resp 18   Ht 5' 3" (1 6 m)   Wt 73 9 kg (163 lb)   SpO2 95%   BMI 28 87 kg/m²   General appearance: alert and oriented, in no acute distress  Lungs: clear to auscultation bilaterally  Heart: regular rate and rhythm, S1, S2 normal, no murmur, click, rub or gallop  Abdomen: Abdomen is soft and mildly distended  Three surgical incisions are noted  Incisions are covered with glue and appear to be well-approximated without bleeding or drainage  Mild tenderness near incision sites  Lab, Imaging and other studies:  I have personally reviewed pertinent lab results      CBC:   Lab Results   Component Value Date    WBC 13 52 (H) 05/26/2022    HGB 13 9 05/26/2022    HCT 41 9 05/26/2022    MCV 94 05/26/2022     05/26/2022    MCH 31 2 05/26/2022    MCHC 33 2 05/26/2022    RDW 13 0 05/26/2022    MPV 10 5 05/26/2022    NRBC 0 05/26/2022   CMP:   Lab Results   Component Value Date    SODIUM 140 05/26/2022    K 4 0 05/26/2022     05/26/2022    CO2 26 05/26/2022    BUN 14 05/26/2022    CREATININE 0 89 05/26/2022    CALCIUM 8 9 05/26/2022    AST 16 05/26/2022    ALT 24 05/26/2022    ALKPHOS 76 05/26/2022    EGFR 71 05/26/2022   Urinalysis:   Lab Results   Component Value Date    COLORU Light Yellow 05/26/2022    CLARITYU Clear 05/26/2022    SPECGRAV 1 015 05/26/2022    PHUR 6 0 05/26/2022    LEUKOCYTESUR Negative 05/26/2022    NITRITE Negative 05/26/2022    GLUCOSEU Negative 05/26/2022    KETONESU Negative 05/26/2022    BILIRUBINUR Negative 05/26/2022    BLOODU Trace-lysed (A) 05/26/2022   Lipase:   Lab Results   Component Value Date    LIPASE 162 05/26/2022     VTE Mechanical Prophylaxis: sequential compression device    Timoteo Lam PA-C

## 2022-05-27 NOTE — PLAN OF CARE
Problem: Potential for Falls  Goal: Patient will remain free of falls  Description: INTERVENTIONS:  - Educate patient/family on patient safety including physical limitations  - Instruct patient to call for assistance with activity   - Consult OT/PT to assist with strengthening/mobility   - Keep Call bell within reach  - Keep bed low and locked with side rails adjusted as appropriate  - Keep care items and personal belongings within reach  - Initiate and maintain comfort rounds  - Make Fall Risk Sign visible to staff  - Offer Toileting every   Hours, in advance of need  - Initiate/Maintain   alarm  - Obtain necessary fall risk management equipment:     - Apply yellow socks and bracelet for high fall risk patients  - Consider moving patient to room near nurses station  Outcome: Progressing     Problem: PAIN - ADULT  Goal: Verbalizes/displays adequate comfort level or baseline comfort level  Description: Interventions:  - Encourage patient to monitor pain and request assistance  - Assess pain using appropriate pain scale  - Administer analgesics based on type and severity of pain and evaluate response  - Implement non-pharmacological measures as appropriate and evaluate response  - Consider cultural and social influences on pain and pain management  - Notify physician/advanced practitioner if interventions unsuccessful or patient reports new pain  Outcome: Progressing     Problem: INFECTION - ADULT  Goal: Absence or prevention of progression during hospitalization  Description: INTERVENTIONS:  - Assess and monitor for signs and symptoms of infection  - Monitor lab/diagnostic results  - Monitor all insertion sites, i e  indwelling lines, tubes, and drains  - Monitor endotracheal if appropriate and nasal secretions for changes in amount and color  - [place] appropriate cooling/warming therapies per order  - Administer medications as ordered  - Instruct and encourage patient and family to use good hand hygiene technique  - Identify and instruct in appropriate isolation precautions for identified infection/condition  Outcome: Progressing     Problem: SAFETY ADULT  Goal: Patient will remain free of falls  Description: INTERVENTIONS:  - Educate patient/family on patient safety including physical limitations  - Instruct patient to call for assistance with activity   - Consult OT/PT to assist with strengthening/mobility   - Keep Call bell within reach  - Keep bed low and locked with side rails adjusted as appropriate  - Keep care items and personal belongings within reach  - Initiate and maintain comfort rounds  - Make Fall Risk Sign visible to staff  - Offer Toileting every   Hours, in advance of need  - Initiate/Maintain   alarm  - Obtain necessary fall risk management equipment:     - Apply yellow socks and bracelet for high fall risk patients  - Consider moving patient to room near nurses station  Outcome: Progressing  Goal: Maintain or return to baseline ADL function  Description: INTERVENTIONS:  -  Assess patient's ability to carry out ADLs; assess patient's baseline for ADL function and identify physical deficits which impact ability to perform ADLs (bathing, care of mouth/teeth, toileting, grooming, dressing, etc )  - Assess/evaluate cause of self-care deficits   - Assess range of motion  - Assess patient's mobility; develop plan if impaired  - Assess patient's need for assistive devices and provide as appropriate  - Encourage maximum independence but intervene and supervise when necessary  - Involve family in performance of ADLs  - Assess for home care needs following discharge   - Consider OT consult to assist with ADL evaluation and planning for discharge  - Provide patient education as appropriate  Outcome: Progressing  Goal: Maintains/Returns to pre admission functional level  Description: INTERVENTIONS:  - Perform BMAT or MOVE assessment daily    - Set and communicate daily mobility goal to care team and patient/family/caregiver  - Collaborate with rehabilitation services on mobility goals if consulted  - Perform Range of Motion   times a day  - Reposition patient every   hours  - Dangle patient   times a day  - Stand patient   times a day  - Ambulate patient   times a day  - Out of bed to chair   times a day   - Out of bed for meals     times a day  - Out of bed for toileting  - Record patient progress and toleration of activity level   Outcome: Progressing     Problem: DISCHARGE PLANNING  Goal: Discharge to home or other facility with appropriate resources  Description: INTERVENTIONS:  - Identify barriers to discharge w/patient and caregiver  - Arrange for needed discharge resources and transportation as appropriate  - Identify discharge learning needs (meds, wound care, etc )  - Arrange for interpretive services to assist at discharge as needed  - Refer to Case Management Department for coordinating discharge planning if the patient needs post-hospital services based on physician/advanced practitioner order or complex needs related to functional status, cognitive ability, or social support system  Outcome: Progressing     Problem: Knowledge Deficit  Goal: Patient/family/caregiver demonstrates understanding of disease process, treatment plan, medications, and discharge instructions  Description: Complete learning assessment and assess knowledge base    Interventions:  - Provide teaching at level of understanding  - Provide teaching via preferred learning methods  Outcome: Progressing     Problem: GASTROINTESTINAL - ADULT  Goal: Minimal or absence of nausea and/or vomiting  Description: INTERVENTIONS:  - Administer IV fluids if ordered to ensure adequate hydration  - Maintain NPO status until nausea and vomiting are resolved  - Nasogastric tube if ordered  - Administer ordered antiemetic medications as needed  - Provide nonpharmacologic comfort measures as appropriate  - Advance diet as tolerated, if ordered  - Consider nutrition services referral to assist patient with adequate nutrition and appropriate food choices  Outcome: Progressing  Goal: Maintains or returns to baseline bowel function  Description: INTERVENTIONS:  - Assess bowel function  - Encourage oral fluids to ensure adequate hydration  - Administer IV fluids if ordered to ensure adequate hydration  - Administer ordered medications as needed  - Encourage mobilization and activity  - Consider nutritional services referral to assist patient with adequate nutrition and appropriate food choices  Outcome: Progressing  Goal: Maintains adequate nutritional intake  Description: INTERVENTIONS:  - Monitor percentage of each meal consumed  - Identify factors contributing to decreased intake, treat as appropriate  - Assist with meals as needed  - Monitor I&O, weight, and lab values if indicated  - Obtain nutrition services referral as needed  Outcome: Progressing  Goal: Oral mucous membranes remain intact  Description: INTERVENTIONS  - Assess oral mucosa and hygiene practices  - Implement preventative oral hygiene regimen  - Implement oral medicated treatments as ordered  - Initiate Nutrition services referral as needed  Outcome: Progressing

## 2022-05-27 NOTE — PLAN OF CARE
Problem: Potential for Falls  Goal: Patient will remain free of falls  Description: INTERVENTIONS:  - Educate patient/family on patient safety including physical limitations  - Instruct patient to call for assistance with activity   - Consult OT/PT to assist with strengthening/mobility   - Keep Call bell within reach  - Keep bed low and locked with side rails adjusted as appropriate  - Keep care items and personal belongings within reach  - Initiate and maintain comfort rounds  - Make Fall Risk Sign visible to staff  - Offer Toileting every   Hours, in advance of need  - Initiate/Maintain   alarm  - Obtain necessary fall risk management equipment:     - Apply yellow socks and bracelet for high fall risk patients  - Consider moving patient to room near nurses station  5/27/2022 1023 by Germán Ponce RN  Outcome: Adequate for Discharge  5/27/2022 0931 by Germán Ponce RN  Outcome: Progressing     Problem: PAIN - ADULT  Goal: Verbalizes/displays adequate comfort level or baseline comfort level  Description: Interventions:  - Encourage patient to monitor pain and request assistance  - Assess pain using appropriate pain scale  - Administer analgesics based on type and severity of pain and evaluate response  - Implement non-pharmacological measures as appropriate and evaluate response  - Consider cultural and social influences on pain and pain management  - Notify physician/advanced practitioner if interventions unsuccessful or patient reports new pain  5/27/2022 1023 by Germán Ponce RN  Outcome: Adequate for Discharge  5/27/2022 0931 by Germán Ponce RN  Outcome: Progressing     Problem: INFECTION - ADULT  Goal: Absence or prevention of progression during hospitalization  Description: INTERVENTIONS:  - Assess and monitor for signs and symptoms of infection  - Monitor lab/diagnostic results  - Monitor all insertion sites, i e  indwelling lines, tubes, and drains  - Monitor endotracheal if appropriate and nasal secretions for changes in amount and color  - [place] appropriate cooling/warming therapies per order  - Administer medications as ordered  - Instruct and encourage patient and family to use good hand hygiene technique  - Identify and instruct in appropriate isolation precautions for identified infection/condition  5/27/2022 1023 by Josie Paul RN  Outcome: Adequate for Discharge  5/27/2022 0931 by Josie Paul RN  Outcome: Progressing     Problem: SAFETY ADULT  Goal: Patient will remain free of falls  Description: INTERVENTIONS:  - Educate patient/family on patient safety including physical limitations  - Instruct patient to call for assistance with activity   - Consult OT/PT to assist with strengthening/mobility   - Keep Call bell within reach  - Keep bed low and locked with side rails adjusted as appropriate  - Keep care items and personal belongings within reach  - Initiate and maintain comfort rounds  - Make Fall Risk Sign visible to staff  - Offer Toileting every   Hours, in advance of need  - Initiate/Maintain   alarm  - Obtain necessary fall risk management equipment:     - Apply yellow socks and bracelet for high fall risk patients  - Consider moving patient to room near nurses station  5/27/2022 1023 by Josie Paul RN  Outcome: Adequate for Discharge  5/27/2022 0931 by Josie Paul RN  Outcome: Progressing  Goal: Maintain or return to baseline ADL function  Description: INTERVENTIONS:  -  Assess patient's ability to carry out ADLs; assess patient's baseline for ADL function and identify physical deficits which impact ability to perform ADLs (bathing, care of mouth/teeth, toileting, grooming, dressing, etc )  - Assess/evaluate cause of self-care deficits   - Assess range of motion  - Assess patient's mobility; develop plan if impaired  - Assess patient's need for assistive devices and provide as appropriate  - Encourage maximum independence but intervene and supervise when necessary  - Involve family in performance of ADLs  - Assess for home care needs following discharge   - Consider OT consult to assist with ADL evaluation and planning for discharge  - Provide patient education as appropriate  5/27/2022 1023 by Hector Blount RN  Outcome: Adequate for Discharge  5/27/2022 0931 by Hector Blount RN  Outcome: Progressing  Goal: Maintains/Returns to pre admission functional level  Description: INTERVENTIONS:  - Perform BMAT or MOVE assessment daily    - Set and communicate daily mobility goal to care team and patient/family/caregiver  - Collaborate with rehabilitation services on mobility goals if consulted  - Perform Range of Motion   times a day  - Reposition patient every   hours  - Dangle patient   times a day  - Stand patient   times a day  - Ambulate patient   times a day  - Out of bed to chair   times a day   - Out of bed for meals   times a day  - Out of bed for toileting  - Record patient progress and toleration of activity level   5/27/2022 1023 by Hector Blount RN  Outcome: Adequate for Discharge  5/27/2022 0931 by Hector Blount RN  Outcome: Progressing     Problem: SAFETY ADULT  Goal: Patient will remain free of falls  Description: INTERVENTIONS:  - Educate patient/family on patient safety including physical limitations  - Instruct patient to call for assistance with activity   - Consult OT/PT to assist with strengthening/mobility   - Keep Call bell within reach  - Keep bed low and locked with side rails adjusted as appropriate  - Keep care items and personal belongings within reach  - Initiate and maintain comfort rounds  - Make Fall Risk Sign visible to staff  - Offer Toileting every   Hours, in advance of need  - Initiate/Maintain   alarm  - Obtain necessary fall risk management equipment:     - Apply yellow socks and bracelet for high fall risk patients  - Consider moving patient to room near nurses station  5/27/2022 1023 by Hector Blount RN  Outcome: Adequate for Discharge  5/27/2022 0931 by Hector Blount RN  Outcome: Progressing  Goal: Maintain or return to baseline ADL function  Description: INTERVENTIONS:  -  Assess patient's ability to carry out ADLs; assess patient's baseline for ADL function and identify physical deficits which impact ability to perform ADLs (bathing, care of mouth/teeth, toileting, grooming, dressing, etc )  - Assess/evaluate cause of self-care deficits   - Assess range of motion  - Assess patient's mobility; develop plan if impaired  - Assess patient's need for assistive devices and provide as appropriate  - Encourage maximum independence but intervene and supervise when necessary  - Involve family in performance of ADLs  - Assess for home care needs following discharge   - Consider OT consult to assist with ADL evaluation and planning for discharge  - Provide patient education as appropriate  5/27/2022 1023 by Juana Guerra RN  Outcome: Adequate for Discharge  5/27/2022 0931 by Juana Guerra RN  Outcome: Progressing  Goal: Maintains/Returns to pre admission functional level  Description: INTERVENTIONS:  - Perform BMAT or MOVE assessment daily    - Set and communicate daily mobility goal to care team and patient/family/caregiver  - Collaborate with rehabilitation services on mobility goals if consulted  - Perform Range of Motion   times a day  - Reposition patient every   hours  - Dangle patient   times a day  - Stand patient   times a day  - Ambulate patient   times a day  - Out of bed to chair   times a day   - Out of bed for meals    times a day  - Out of bed for toileting  - Record patient progress and toleration of activity level   5/27/2022 1023 by Juana Guerra RN  Outcome: Adequate for Discharge  5/27/2022 0931 by Juana Guerra RN  Outcome: Progressing     Problem: DISCHARGE PLANNING  Goal: Discharge to home or other facility with appropriate resources  Description: INTERVENTIONS:  - Identify barriers to discharge w/patient and caregiver  - Arrange for needed discharge resources and transportation as appropriate  - Identify discharge learning needs (meds, wound care, etc )  - Arrange for interpretive services to assist at discharge as needed  - Refer to Case Management Department for coordinating discharge planning if the patient needs post-hospital services based on physician/advanced practitioner order or complex needs related to functional status, cognitive ability, or social support system  5/27/2022 1023 by Aaron Scott RN  Outcome: Adequate for Discharge  5/27/2022 0931 by Aaron Scott RN  Outcome: Progressing     Problem: Knowledge Deficit  Goal: Patient/family/caregiver demonstrates understanding of disease process, treatment plan, medications, and discharge instructions  Description: Complete learning assessment and assess knowledge base    Interventions:  - Provide teaching at level of understanding  - Provide teaching via preferred learning methods  5/27/2022 1023 by Aaron Scott RN  Outcome: Adequate for Discharge  5/27/2022 0931 by Aaron Scott RN  Outcome: Progressing     Problem: GASTROINTESTINAL - ADULT  Goal: Minimal or absence of nausea and/or vomiting  Description: INTERVENTIONS:  - Administer IV fluids if ordered to ensure adequate hydration  - Maintain NPO status until nausea and vomiting are resolved  - Nasogastric tube if ordered  - Administer ordered antiemetic medications as needed  - Provide nonpharmacologic comfort measures as appropriate  - Advance diet as tolerated, if ordered  - Consider nutrition services referral to assist patient with adequate nutrition and appropriate food choices  5/27/2022 1023 by Aaron Scott RN  Outcome: Adequate for Discharge  5/27/2022 0931 by Aaron Scott RN  Outcome: Progressing  Goal: Maintains or returns to baseline bowel function  Description: INTERVENTIONS:  - Assess bowel function  - Encourage oral fluids to ensure adequate hydration  - Administer IV fluids if ordered to ensure adequate hydration  - Administer ordered medications as needed  - Encourage mobilization and activity  - Consider nutritional services referral to assist patient with adequate nutrition and appropriate food choices  5/27/2022 1023 by Racheal Morgan RN  Outcome: Adequate for Discharge  5/27/2022 0931 by Racheal Morgan RN  Outcome: Progressing  Goal: Maintains adequate nutritional intake  Description: INTERVENTIONS:  - Monitor percentage of each meal consumed  - Identify factors contributing to decreased intake, treat as appropriate  - Assist with meals as needed  - Monitor I&O, weight, and lab values if indicated  - Obtain nutrition services referral as needed  5/27/2022 1023 by Racheal Morgan RN  Outcome: Adequate for Discharge  5/27/2022 0931 by Racheal Morgan RN  Outcome: Progressing  Goal: Oral mucous membranes remain intact  Description: INTERVENTIONS  - Assess oral mucosa and hygiene practices  - Implement preventative oral hygiene regimen  - Implement oral medicated treatments as ordered  - Initiate Nutrition services referral as needed  5/27/2022 1023 by Racheal Morgan RN  Outcome: Adequate for Discharge  5/27/2022 0931 by Racheal Morgan RN  Outcome: Progressing

## 2022-05-27 NOTE — DISCHARGE SUMMARY
Discharge Summary - Roland Arellano 63 y o  female MRN: [id number]    Unit/Bed#: -01 Encounter: [id number]    Admission Date: 5/26/22    Admitting Diagnosis: Epigastric pain [R10 13]  Appendicitis [K37]  Abdominal pain [I32 2]  Periumbilical abdominal pain [R10 33]    HPI: Roland Arellano is a 63 y o  female with prior history of cholecystectomy who presented to [facility] [place] ED this AM with abdominal pain  Patient explains starting [facility] night she began with periumbilical pain that now seemed to travel a bit superiorly  She denies prior pain like this  She does state she was previously made aware her appendix was enlarged but nothing needed to be done at that time  She explains she was able to tolerate food without any pain and has had hot tea and toast Thursday AM without nausea or vomiting  Patient denies feeling warm and did check her temperature at home which was not significant for a fever  She has had a colonoscopy in the past  Denies any significant PMH or other PSH  Procedures Performed: Laparoscopic appendectomy with Dr Carleen Oro 5/26/22    Summary of Hospital Course:  Patient presented to ED with abdominal pain  CT scan performed which noted enlarged appendix with periappendiceal fat stranding, suggestive of acute appendicitis  After evaluation by surgeon consent was obtained, patient was brought to the OR for laparoscopic appendectomy  The procedure was performed without complications  Patient recovered well and was able tolerate clear liquids the night after the procedure  POD#1 patient tolerated regular diet and symptoms were greatly improved  Patient stable for discharge  Significant Findings, Care, Treatment and Services Provided:  Acute appendicitis treated with IV antibiotics and laparoscopic appendectomy      Complications: None    Discharge Diagnosis:  Status post laparoscopic appendectomy    Medical Problems                   Condition at Discharge: good         Discharge instructions/Information to patient and family:   See after visit summary for information provided to patient and family  Provisions for Follow-Up Care:  See after visit summary for information related to follow-up care and any pertinent home health orders  PCP: Dione Hollis MD    Disposition: Home    Planned Readmission: No      Discharge Statement   I spent 15 minutes discharging the patient  This time was spent on the day of discharge  I had direct contact with the patient on the day of discharge  Additional documentation is required if more than 30 minutes were spent on discharge  Discharge Medications:  See after visit summary for reconciled discharge medications provided to patient and family       Saima Jarquin PA-C

## 2024-06-28 ENCOUNTER — HOSPITAL ENCOUNTER (OUTPATIENT)
Dept: NON INVASIVE DIAGNOSTICS | Facility: HOSPITAL | Age: 64
Discharge: HOME | End: 2024-06-28
Payer: COMMERCIAL

## 2024-06-28 VITALS — BODY MASS INDEX: 26.8 KG/M2 | HEIGHT: 64 IN | WEIGHT: 157 LBS

## 2024-06-28 DIAGNOSIS — R55 SYNCOPE AND COLLAPSE: ICD-10-CM

## 2024-06-28 DIAGNOSIS — R94.31 ABNORMAL ELECTROCARDIOGRAM (ECG) (EKG): ICD-10-CM

## 2024-06-28 LAB
AORTIC ROOT: 2.9 CM
AORTIC VALVE MEAN VELOCITY: 8.8 M/S
APICAL FOUR CHAMBER EJECTION FRACTION: 48 %
ASCENDING AORTA: 2.6 CM
AV AREA BY CONTINUOUS VTI: 2.3 CM2
AV AREA PEAK VELOCITY: 2.1 CM2
AV LVOT MEAN GRADIENT: 2 MMHG
AV LVOT PEAK GRADIENT: 4 MMHG
AV MEAN GRADIENT: 4 MMHG
AV PEAK GRADIENT: 8 MMHG
AV VALVE AREA: 2.31 CM2
AV VELOCITY RATIO: 0.74
BSA FOR ECHO PROCEDURE: 1.76 M2
DOP CALC AO PEAK VEL: 1.42 M/S
DOP CALC AO VTI: 26.71 CM
DOP CALC LVOT AREA: 2.83 CM2
DOP CALC LVOT CARDIAC INDEX: 2.56 L/MIN/M2
DOP CALC LVOT CARDIAC OUTPUT: 4.54 L/MIN
DOP CALC LVOT DIAMETER: 1.9 CM
DOP CALC LVOT PEAK VEL VTI: 21.77 CM
DOP CALC LVOT PEAK VEL: 1.05 M/S
DOP CALC LVOT STROKE INDEX: 34.5 ML/M2
DOP CALC LVOT STROKE VOLUME: 61.69
E WAVE DECELERATION TIME: 220 MS
E/A RATIO: 1.19
FRACTIONAL SHORTENING: 28 (ref 28–44)
INTERVENTRICULAR SEPTUM IN DIASTOLE (PARASTERNAL SHORT AXIS VIEW): 0.9 CM
INTERVENTRICULAR SEPTUM: 0.9 CM (ref 0.6–1.1)
LAAS-AP2: 14.7 CM2
LAAS-AP4: 12.3 CM2
LEFT ATRIUM SIZE: 3.5 CM
LEFT ATRIUM VOLUME (MOD BIPLANE): 33 ML
LEFT ATRIUM VOLUME INDEX (MOD BIPLANE): 18.6 ML/M2
LEFT INTERNAL DIMENSION IN SYSTOLE: 2.9 CM (ref 2.1–4)
LEFT VENTRICLE DIASTOLIC VOLUME (MOD BIPLANE): 60 ML
LEFT VENTRICLE DIASTOLIC VOLUME INDEX (MOD BIPLANE): 34.1 ML/M2
LEFT VENTRICLE SYSTOLIC VOLUME (MOD BIPLANE): 27 ML
LEFT VENTRICLE SYSTOLIC VOLUME INDEX (MOD BIPLANE): 15.3 ML/M2
LEFT VENTRICULAR INTERNAL DIMENSION IN DIASTOLE: 4 CM (ref 3.5–6)
LEFT VENTRICULAR POSTERIOR WALL IN END DIASTOLE: 0.9 CM
LEFT VENTRICULAR STROKE VOLUME: 37 ML
LV EF: 55 %
LVSV (TEICH): 37 ML
MV E'TISSUE VEL-LAT: 9 CM/S
MV E'TISSUE VEL-SEP: 8 CM/S
MV PEAK A VEL: 0.62 M/S
MV PEAK E VEL: 74 CM/S
MV STENOSIS PRESSURE HALF TIME: 64 MS
MV VALVE AREA P 1/2 METHOD: 3.44
RA PRESSURE ESTIMATED: 3 MMHG
RIGHT ATRIUM AREA SYSTOLE A4C: 10.3 CM2
RIGHT VENTRICLE ID DIMENSION: 3 CM
SL CV LEFT ATRIUM LENGTH A2C: 4.6 CM
SL CV LV EF: 55
SL CV PED ECHO LEFT VENTRICLE DIASTOLIC VOLUME (MOD BIPLANE) 2D: 70 ML
SL CV PED ECHO LEFT VENTRICLE SYSTOLIC VOLUME (MOD BIPLANE) 2D: 33 ML
TRICUSPID ANNULAR PLANE SYSTOLIC EXCURSION: 2.2 CM

## 2024-06-28 PROCEDURE — 93306 TTE W/DOPPLER COMPLETE: CPT

## (undated) DEVICE — TISSUE RETRIEVAL SYSTEM: Brand: INZII RETRIEVAL SYSTEM

## (undated) DEVICE — PENCIL ELECTROSURG E-Z CLEAN -0035H

## (undated) DEVICE — ELECTRODE LAP J HOOK E-Z CLEAN 33CM-0021

## (undated) DEVICE — ENDOPATH PNEUMONEEDLE INSUFFLATION NEEDLES WITH LUER LOCK CONNECTORS 120MM: Brand: ENDOPATH

## (undated) DEVICE — ENDOPOUCH RETRIEVER SPECIMEN RETRIEVAL BAGS: Brand: ENDOPOUCH RETRIEVER

## (undated) DEVICE — INTENDED FOR TISSUE SEPARATION, AND OTHER PROCEDURES THAT REQUIRE A SHARP SURGICAL BLADE TO PUNCTURE OR CUT.: Brand: BARD-PARKER SAFETY BLADES SIZE 11, STERILE

## (undated) DEVICE — 5 MM BABCOCKS WITH RATCHET HANDLES: Brand: ENDOPATH

## (undated) DEVICE — PMI DISPOSABLE PUNCTURE CLOSURE DEVICE / SUTURE GRASPER: Brand: PMI

## (undated) DEVICE — ENDOPATH XCEL UNIVERSAL TROCAR STABLILITY SLEEVES: Brand: ENDOPATH XCEL

## (undated) DEVICE — HARMONIC ACE 5MM DIAMETER SHEARS 36CM SHAFT LENGTH + ADAPTIVE TISSUE TECHNOLOGY FOR USE WITH GENERATOR G11: Brand: HARMONIC ACE

## (undated) DEVICE — TROCAR: Brand: KII FIOS FIRST ENTRY

## (undated) DEVICE — CHLORAPREP HI-LITE 26ML ORANGE

## (undated) DEVICE — SUT VICRYL 4-0 PS-2 18 IN J496G

## (undated) DEVICE — SINGLE PORT MANIFOLD: Brand: NEPTUNE 2

## (undated) DEVICE — PDS II VLT 0 107CM AG ST3: Brand: ENDOLOOP

## (undated) DEVICE — GLOVE INDICATOR PI UNDERGLOVE SZ 6.5 BLUE

## (undated) DEVICE — ALLENTOWN LAP CHOLE APP PACK: Brand: CARDINAL HEALTH

## (undated) DEVICE — ADHESIVE SKIN HIGH VISCOSITY EXOFIN 1ML

## (undated) DEVICE — GLOVE SRG BIOGEL 6

## (undated) DEVICE — SUT VICRYL 0 REEL 54 IN J287G

## (undated) DEVICE — VISUALIZATION SYSTEM: Brand: CLEARIFY

## (undated) DEVICE — PAD GROUNDING ADULT

## (undated) DEVICE — TUBING INSUFFLATION SET ISO CONNECTOR